# Patient Record
Sex: MALE | Race: WHITE | NOT HISPANIC OR LATINO | URBAN - METROPOLITAN AREA
[De-identification: names, ages, dates, MRNs, and addresses within clinical notes are randomized per-mention and may not be internally consistent; named-entity substitution may affect disease eponyms.]

---

## 2018-07-01 ENCOUNTER — INPATIENT (INPATIENT)
Facility: HOSPITAL | Age: 48
LOS: 2 days | Discharge: HOME | DRG: 872 | End: 2018-07-04
Attending: INTERNAL MEDICINE | Admitting: INTERNAL MEDICINE
Payer: COMMERCIAL

## 2018-07-01 VITALS
OXYGEN SATURATION: 96 % | DIASTOLIC BLOOD PRESSURE: 68 MMHG | SYSTOLIC BLOOD PRESSURE: 124 MMHG | HEART RATE: 118 BPM | RESPIRATION RATE: 20 BRPM | TEMPERATURE: 101 F

## 2018-07-01 DIAGNOSIS — R09.02 HYPOXEMIA: ICD-10-CM

## 2018-07-01 LAB
ALBUMIN SERPL ELPH-MCNC: 4.3 G/DL — SIGNIFICANT CHANGE UP (ref 3.3–5)
ALP SERPL-CCNC: 57 U/L — SIGNIFICANT CHANGE UP (ref 40–120)
ALT FLD-CCNC: 41 U/L — SIGNIFICANT CHANGE UP (ref 10–45)
ANION GAP SERPL CALC-SCNC: 16 MMOL/L — SIGNIFICANT CHANGE UP (ref 5–17)
APPEARANCE UR: CLEAR — SIGNIFICANT CHANGE UP
AST SERPL-CCNC: 19 U/L — SIGNIFICANT CHANGE UP (ref 10–40)
BASE EXCESS BLDV CALC-SCNC: 1.8 MMOL/L — SIGNIFICANT CHANGE UP (ref -2–2)
BASE EXCESS BLDV CALC-SCNC: 4.1 MMOL/L — HIGH (ref -2–2)
BASOPHILS # BLD AUTO: 0 K/UL — SIGNIFICANT CHANGE UP (ref 0–0.2)
BILIRUB SERPL-MCNC: 0.6 MG/DL — SIGNIFICANT CHANGE UP (ref 0.2–1.2)
BILIRUB UR-MCNC: NEGATIVE — SIGNIFICANT CHANGE UP
BUN SERPL-MCNC: 17 MG/DL — SIGNIFICANT CHANGE UP (ref 7–23)
CA-I SERPL-SCNC: 1.12 MMOL/L — SIGNIFICANT CHANGE UP (ref 1.12–1.3)
CA-I SERPL-SCNC: 1.17 MMOL/L — SIGNIFICANT CHANGE UP (ref 1.12–1.3)
CALCIUM SERPL-MCNC: 9.4 MG/DL — SIGNIFICANT CHANGE UP (ref 8.4–10.5)
CHLORIDE BLDV-SCNC: 105 MMOL/L — SIGNIFICANT CHANGE UP (ref 96–108)
CHLORIDE BLDV-SCNC: 106 MMOL/L — SIGNIFICANT CHANGE UP (ref 96–108)
CHLORIDE SERPL-SCNC: 99 MMOL/L — SIGNIFICANT CHANGE UP (ref 96–108)
CO2 BLDV-SCNC: 29 MMOL/L — SIGNIFICANT CHANGE UP (ref 22–30)
CO2 BLDV-SCNC: 30 MMOL/L — SIGNIFICANT CHANGE UP (ref 22–30)
CO2 SERPL-SCNC: 26 MMOL/L — SIGNIFICANT CHANGE UP (ref 22–31)
COLOR SPEC: YELLOW — SIGNIFICANT CHANGE UP
CREAT SERPL-MCNC: 0.95 MG/DL — SIGNIFICANT CHANGE UP (ref 0.5–1.3)
DIFF PNL FLD: NEGATIVE — SIGNIFICANT CHANGE UP
EOSINOPHIL # BLD AUTO: 0.1 K/UL — SIGNIFICANT CHANGE UP (ref 0–0.5)
GAS PNL BLDV: 135 MMOL/L — LOW (ref 136–145)
GAS PNL BLDV: 137 MMOL/L — SIGNIFICANT CHANGE UP (ref 136–145)
GAS PNL BLDV: SIGNIFICANT CHANGE UP
GLUCOSE BLDV-MCNC: 106 MG/DL — HIGH (ref 70–99)
GLUCOSE BLDV-MCNC: 121 MG/DL — HIGH (ref 70–99)
GLUCOSE SERPL-MCNC: 119 MG/DL — HIGH (ref 70–99)
GLUCOSE UR QL: NEGATIVE — SIGNIFICANT CHANGE UP
HCO3 BLDV-SCNC: 28 MMOL/L — SIGNIFICANT CHANGE UP (ref 21–29)
HCO3 BLDV-SCNC: 28 MMOL/L — SIGNIFICANT CHANGE UP (ref 21–29)
HCT VFR BLD CALC: 44.2 % — SIGNIFICANT CHANGE UP (ref 39–50)
HCT VFR BLDA CALC: 43 % — SIGNIFICANT CHANGE UP (ref 39–50)
HCT VFR BLDA CALC: 44 % — SIGNIFICANT CHANGE UP (ref 39–50)
HGB BLD CALC-MCNC: 14.1 G/DL — SIGNIFICANT CHANGE UP (ref 13–17)
HGB BLD CALC-MCNC: 14.3 G/DL — SIGNIFICANT CHANGE UP (ref 13–17)
HGB BLD-MCNC: 14.8 G/DL — SIGNIFICANT CHANGE UP (ref 13–17)
KETONES UR-MCNC: NEGATIVE — SIGNIFICANT CHANGE UP
LACTATE BLDV-MCNC: 2.1 MMOL/L — HIGH (ref 0.7–2)
LACTATE BLDV-MCNC: 2.8 MMOL/L — HIGH (ref 0.7–2)
LEUKOCYTE ESTERASE UR-ACNC: NEGATIVE — SIGNIFICANT CHANGE UP
LYMPHOCYTES # BLD AUTO: 0.7 K/UL — LOW (ref 1–3.3)
LYMPHOCYTES # BLD AUTO: 1 % — LOW (ref 13–44)
MCHC RBC-ENTMCNC: 29.7 PG — SIGNIFICANT CHANGE UP (ref 27–34)
MCHC RBC-ENTMCNC: 33.5 GM/DL — SIGNIFICANT CHANGE UP (ref 32–36)
MCV RBC AUTO: 88.7 FL — SIGNIFICANT CHANGE UP (ref 80–100)
MONOCYTES # BLD AUTO: 0.5 K/UL — SIGNIFICANT CHANGE UP (ref 0–0.9)
MONOCYTES NFR BLD AUTO: 5 % — SIGNIFICANT CHANGE UP (ref 2–14)
NEUTROPHILS # BLD AUTO: 16.8 K/UL — HIGH (ref 1.8–7.4)
NEUTROPHILS NFR BLD AUTO: 91 % — HIGH (ref 43–77)
NEUTS BAND # BLD: 3 % — SIGNIFICANT CHANGE UP (ref 0–8)
NITRITE UR-MCNC: NEGATIVE — SIGNIFICANT CHANGE UP
OTHER CELLS CSF MANUAL: 10 ML/DL — LOW (ref 18–22)
OTHER CELLS CSF MANUAL: 12 ML/DL — LOW (ref 18–22)
PCO2 BLDV: 42 MMHG — SIGNIFICANT CHANGE UP (ref 35–50)
PCO2 BLDV: 51 MMHG — HIGH (ref 35–50)
PH BLDV: 7.35 — SIGNIFICANT CHANGE UP (ref 7.35–7.45)
PH BLDV: 7.44 — SIGNIFICANT CHANGE UP (ref 7.35–7.45)
PH UR: 6 — SIGNIFICANT CHANGE UP (ref 5–8)
PLAT MORPH BLD: NORMAL — SIGNIFICANT CHANGE UP
PLATELET # BLD AUTO: 267 K/UL — SIGNIFICANT CHANGE UP (ref 150–400)
PO2 BLDV: 27 MMHG — SIGNIFICANT CHANGE UP (ref 25–45)
PO2 BLDV: 34 MMHG — SIGNIFICANT CHANGE UP (ref 25–45)
POTASSIUM BLDV-SCNC: 3.5 MMOL/L — SIGNIFICANT CHANGE UP (ref 3.5–5.3)
POTASSIUM BLDV-SCNC: 3.6 MMOL/L — SIGNIFICANT CHANGE UP (ref 3.5–5.3)
POTASSIUM SERPL-MCNC: 3.9 MMOL/L — SIGNIFICANT CHANGE UP (ref 3.5–5.3)
POTASSIUM SERPL-SCNC: 3.9 MMOL/L — SIGNIFICANT CHANGE UP (ref 3.5–5.3)
PROT SERPL-MCNC: 7.8 G/DL — SIGNIFICANT CHANGE UP (ref 6–8.3)
PROT UR-MCNC: NEGATIVE — SIGNIFICANT CHANGE UP
RAPID RVP RESULT: SIGNIFICANT CHANGE UP
RBC # BLD: 4.99 M/UL — SIGNIFICANT CHANGE UP (ref 4.2–5.8)
RBC # FLD: 12.3 % — SIGNIFICANT CHANGE UP (ref 10.3–14.5)
RBC BLD AUTO: NORMAL — SIGNIFICANT CHANGE UP
SAO2 % BLDV: 49 % — LOW (ref 67–88)
SAO2 % BLDV: 60 % — LOW (ref 67–88)
SODIUM SERPL-SCNC: 141 MMOL/L — SIGNIFICANT CHANGE UP (ref 135–145)
SP GR SPEC: 1.01 — SIGNIFICANT CHANGE UP (ref 1.01–1.02)
TROPONIN T, HIGH SENSITIVITY RESULT: <6 NG/L — SIGNIFICANT CHANGE UP (ref 0–51)
UROBILINOGEN FLD QL: NEGATIVE — SIGNIFICANT CHANGE UP
WBC # BLD: 18.2 K/UL — HIGH (ref 3.8–10.5)
WBC # FLD AUTO: 18.2 K/UL — HIGH (ref 3.8–10.5)

## 2018-07-01 PROCEDURE — 99223 1ST HOSP IP/OBS HIGH 75: CPT

## 2018-07-01 PROCEDURE — 74177 CT ABD & PELVIS W/CONTRAST: CPT | Mod: 26

## 2018-07-01 PROCEDURE — 71046 X-RAY EXAM CHEST 2 VIEWS: CPT | Mod: 26

## 2018-07-01 PROCEDURE — 71275 CT ANGIOGRAPHY CHEST: CPT | Mod: 26

## 2018-07-01 PROCEDURE — 99285 EMERGENCY DEPT VISIT HI MDM: CPT

## 2018-07-01 RX ORDER — SODIUM CHLORIDE 9 MG/ML
1000 INJECTION INTRAMUSCULAR; INTRAVENOUS; SUBCUTANEOUS ONCE
Qty: 0 | Refills: 0 | Status: COMPLETED | OUTPATIENT
Start: 2018-07-01 | End: 2018-07-01

## 2018-07-01 RX ORDER — ENOXAPARIN SODIUM 100 MG/ML
180 INJECTION SUBCUTANEOUS ONCE
Qty: 0 | Refills: 0 | Status: COMPLETED | OUTPATIENT
Start: 2018-07-01 | End: 2018-07-02

## 2018-07-01 RX ORDER — ACETAMINOPHEN 500 MG
975 TABLET ORAL ONCE
Qty: 0 | Refills: 0 | Status: COMPLETED | OUTPATIENT
Start: 2018-07-01 | End: 2018-07-01

## 2018-07-01 RX ORDER — KETOROLAC TROMETHAMINE 30 MG/ML
15 SYRINGE (ML) INJECTION ONCE
Qty: 0 | Refills: 0 | Status: DISCONTINUED | OUTPATIENT
Start: 2018-07-01 | End: 2018-07-01

## 2018-07-01 RX ORDER — ONDANSETRON 8 MG/1
4 TABLET, FILM COATED ORAL ONCE
Qty: 0 | Refills: 0 | Status: COMPLETED | OUTPATIENT
Start: 2018-07-01 | End: 2018-07-01

## 2018-07-01 RX ORDER — KETOROLAC TROMETHAMINE 30 MG/ML
30 SYRINGE (ML) INJECTION ONCE
Qty: 0 | Refills: 0 | Status: DISCONTINUED | OUTPATIENT
Start: 2018-07-01 | End: 2018-07-01

## 2018-07-01 RX ADMIN — Medication 15 MILLIGRAM(S): at 22:23

## 2018-07-01 RX ADMIN — SODIUM CHLORIDE 1000 MILLILITER(S): 9 INJECTION INTRAMUSCULAR; INTRAVENOUS; SUBCUTANEOUS at 22:23

## 2018-07-01 RX ADMIN — Medication 30 MILLIGRAM(S): at 17:57

## 2018-07-01 RX ADMIN — Medication 975 MILLIGRAM(S): at 15:19

## 2018-07-01 RX ADMIN — SODIUM CHLORIDE 1000 MILLILITER(S): 9 INJECTION INTRAMUSCULAR; INTRAVENOUS; SUBCUTANEOUS at 15:19

## 2018-07-01 RX ADMIN — Medication 975 MILLIGRAM(S): at 22:23

## 2018-07-01 RX ADMIN — Medication 30 MILLIGRAM(S): at 22:23

## 2018-07-01 RX ADMIN — ONDANSETRON 4 MILLIGRAM(S): 8 TABLET, FILM COATED ORAL at 16:37

## 2018-07-01 NOTE — ED ADULT NURSE REASSESSMENT NOTE - NS ED NURSE REASSESS COMMENT FT1
Pt received sitting in bed diaphoretic. an Pt received sitting in bed diaphoretic, sweat soaking gown. Pt says "I feel like my fever broke." Temp did decrease. Pt was 97% on 3L O2, does not normally use oxygen but he is comfortable using it currently.     Around 2015: pt tachypneic to 30, continues to be on 3L O2, sating well. Denies SOB. Says pain has improved since arrival. Placed on cardiac monitor, tachycardic to 107. MD Prakash and YARED Burrows at bedside. Repeat labs drawn and sent.

## 2018-07-01 NOTE — ED ADULT NURSE NOTE - OBJECTIVE STATEMENT
48 year old male A&OX4 presents with headache, diarrhea, body aches, fevers, chills since this morning. Patient states that on Wednesday he had multiple episodes of diarrhea and finally passed that day. Patient states that this morning he started developing body aches to the lower back, abdomen, lower and upper extremities. Patient also reports headache that started around the same time. Patient was given tramadol and a muscle relaxer by wife and denies relief. Patient's lung sounds are clear and equal bilaterally. Patients abdomen is soft and non tender to palpation. Patient denies nausea, vomiting, chest pain, shortness of breath, palpitations, dizziness, weakness productive cough. Patient denies receiving influenza vaccine this year.

## 2018-07-01 NOTE — ED PROVIDER NOTE - PROGRESS NOTE DETAILS
Eh Gomez MD FACEP patient stable, hypoxic on ra to 92, tachycardic resting, neg PE and ct a/p for abscess/colitis/pe.  Will admit for oxygen and observation for hypoxia/tachycardia/fever

## 2018-07-01 NOTE — ED PROVIDER NOTE - MEDICAL DECISION MAKING DETAILS
ROB Perrin MD: 49 y/o morbidly obese male p/w fever and general malaise x several days, now generalized weakness. S/p multiple episodes of diarrhea earlier in the week. Cough productive with white phlegm. Today Po=828. Today feels LH and dizzy. Some mild abdominal discomfort. Works in sales. DDx: flu, viral syndrome, pna, uti, intraabdominal pathology. Plan: basic labs, bcx, u/a, ucx, cxr, CTAP, pain/fever control, RVP, re-eval

## 2018-07-01 NOTE — ED PROVIDER NOTE - CROS ED ROS STATEMENT
1) No driving for 24 hours and no longer taking narcotics.   2) Return to school / work in 2 weeks.  3) May shower today. No tub bath or standing water for one week.   4) Do not lift / push / pull more then 10 lbs for one week.  5) Report any worsening symptoms.  6) Monitor cath site for signs of bleeding or infection: drainage, swelling, pain, redness, warmth or fever.   7) Report any signs of bleeding.   
all other ROS negative except as per HPI

## 2018-07-01 NOTE — ED PROVIDER NOTE - OBJECTIVE STATEMENT
47 yo morbidly obese male in room 18 accompanied by wife presents to the ER with fever and general malaise. Pt states  "I started non feeling well on Wednesday when I had multiple episodes of diarrhea.  Since then I have weak and tired and this morning I felt feverish and dizzy. I took my temperature and It was 104 so I thought I should come to the ER".  Pt reports mild dull headache and sinus tenderness, which he says is chronic problem and not new.  Productive  cough with white phlegm  reported, denies cp and sob. Reports dizziness since this am. Pt denies nausea and vomiting and diarrhea at this time.  Pt denies any urinary complaints.

## 2018-07-02 DIAGNOSIS — R65.10 SYSTEMIC INFLAMMATORY RESPONSE SYNDROME (SIRS) OF NON-INFECTIOUS ORIGIN WITHOUT ACUTE ORGAN DYSFUNCTION: ICD-10-CM

## 2018-07-02 DIAGNOSIS — E66.9 OBESITY, UNSPECIFIED: ICD-10-CM

## 2018-07-02 DIAGNOSIS — Z29.9 ENCOUNTER FOR PROPHYLACTIC MEASURES, UNSPECIFIED: ICD-10-CM

## 2018-07-02 DIAGNOSIS — R09.02 HYPOXEMIA: ICD-10-CM

## 2018-07-02 DIAGNOSIS — R50.9 FEVER, UNSPECIFIED: ICD-10-CM

## 2018-07-02 DIAGNOSIS — K21.9 GASTRO-ESOPHAGEAL REFLUX DISEASE WITHOUT ESOPHAGITIS: ICD-10-CM

## 2018-07-02 LAB
ANION GAP SERPL CALC-SCNC: 11 MMOL/L — SIGNIFICANT CHANGE UP (ref 5–17)
BASOPHILS # BLD AUTO: 0 K/UL — SIGNIFICANT CHANGE UP (ref 0–0.2)
BASOPHILS NFR BLD AUTO: 0 % — SIGNIFICANT CHANGE UP (ref 0–2)
BUN SERPL-MCNC: 17 MG/DL — SIGNIFICANT CHANGE UP (ref 7–23)
CALCIUM SERPL-MCNC: 8.5 MG/DL — SIGNIFICANT CHANGE UP (ref 8.4–10.5)
CHLORIDE SERPL-SCNC: 100 MMOL/L — SIGNIFICANT CHANGE UP (ref 96–108)
CO2 SERPL-SCNC: 28 MMOL/L — SIGNIFICANT CHANGE UP (ref 22–31)
CREAT SERPL-MCNC: 0.91 MG/DL — SIGNIFICANT CHANGE UP (ref 0.5–1.3)
EOSINOPHIL # BLD AUTO: 0 K/UL — SIGNIFICANT CHANGE UP (ref 0–0.5)
EOSINOPHIL NFR BLD AUTO: 0 % — SIGNIFICANT CHANGE UP (ref 0–6)
GLUCOSE SERPL-MCNC: 96 MG/DL — SIGNIFICANT CHANGE UP (ref 70–99)
HCT VFR BLD CALC: 41.3 % — SIGNIFICANT CHANGE UP (ref 39–50)
HGB BLD-MCNC: 13.3 G/DL — SIGNIFICANT CHANGE UP (ref 13–17)
LYMPHOCYTES # BLD AUTO: 1.27 K/UL — SIGNIFICANT CHANGE UP (ref 1–3.3)
LYMPHOCYTES # BLD AUTO: 5 % — LOW (ref 13–44)
MCHC RBC-ENTMCNC: 28.9 PG — SIGNIFICANT CHANGE UP (ref 27–34)
MCHC RBC-ENTMCNC: 32.2 GM/DL — SIGNIFICANT CHANGE UP (ref 32–36)
MCV RBC AUTO: 89.6 FL — SIGNIFICANT CHANGE UP (ref 80–100)
MONOCYTES # BLD AUTO: 1.02 K/UL — HIGH (ref 0–0.9)
MONOCYTES NFR BLD AUTO: 4 % — SIGNIFICANT CHANGE UP (ref 2–14)
MRSA PCR RESULT.: SIGNIFICANT CHANGE UP
NEUTROPHILS # BLD AUTO: 23.15 K/UL — HIGH (ref 1.8–7.4)
NEUTROPHILS NFR BLD AUTO: 85 % — HIGH (ref 43–77)
PLATELET # BLD AUTO: 263 K/UL — SIGNIFICANT CHANGE UP (ref 150–400)
POTASSIUM SERPL-MCNC: 4.1 MMOL/L — SIGNIFICANT CHANGE UP (ref 3.5–5.3)
POTASSIUM SERPL-SCNC: 4.1 MMOL/L — SIGNIFICANT CHANGE UP (ref 3.5–5.3)
PROCALCITONIN SERPL-MCNC: 4.4 NG/ML — HIGH (ref 0.02–0.1)
RBC # BLD: 4.61 M/UL — SIGNIFICANT CHANGE UP (ref 4.2–5.8)
RBC # FLD: 14.1 % — SIGNIFICANT CHANGE UP (ref 10.3–14.5)
S AUREUS DNA NOSE QL NAA+PROBE: SIGNIFICANT CHANGE UP
SODIUM SERPL-SCNC: 139 MMOL/L — SIGNIFICANT CHANGE UP (ref 135–145)
WBC # BLD: 25.44 K/UL — HIGH (ref 3.8–10.5)
WBC # FLD AUTO: 25.44 K/UL — HIGH (ref 3.8–10.5)

## 2018-07-02 RX ORDER — SODIUM CHLORIDE 9 MG/ML
1000 INJECTION INTRAMUSCULAR; INTRAVENOUS; SUBCUTANEOUS
Qty: 0 | Refills: 0 | Status: DISCONTINUED | OUTPATIENT
Start: 2018-07-02 | End: 2018-07-02

## 2018-07-02 RX ORDER — ACETAMINOPHEN 500 MG
650 TABLET ORAL EVERY 6 HOURS
Qty: 0 | Refills: 0 | Status: DISCONTINUED | OUTPATIENT
Start: 2018-07-02 | End: 2018-07-04

## 2018-07-02 RX ORDER — AMPICILLIN SODIUM AND SULBACTAM SODIUM 250; 125 MG/ML; MG/ML
3 INJECTION, POWDER, FOR SUSPENSION INTRAMUSCULAR; INTRAVENOUS ONCE
Qty: 0 | Refills: 0 | Status: COMPLETED | OUTPATIENT
Start: 2018-07-02 | End: 2018-07-02

## 2018-07-02 RX ORDER — AMPICILLIN SODIUM AND SULBACTAM SODIUM 250; 125 MG/ML; MG/ML
INJECTION, POWDER, FOR SUSPENSION INTRAMUSCULAR; INTRAVENOUS
Qty: 0 | Refills: 0 | Status: DISCONTINUED | OUTPATIENT
Start: 2018-07-02 | End: 2018-07-04

## 2018-07-02 RX ORDER — AMPICILLIN SODIUM AND SULBACTAM SODIUM 250; 125 MG/ML; MG/ML
3 INJECTION, POWDER, FOR SUSPENSION INTRAMUSCULAR; INTRAVENOUS EVERY 6 HOURS
Qty: 0 | Refills: 0 | Status: DISCONTINUED | OUTPATIENT
Start: 2018-07-02 | End: 2018-07-04

## 2018-07-02 RX ORDER — PANTOPRAZOLE SODIUM 20 MG/1
40 TABLET, DELAYED RELEASE ORAL
Qty: 0 | Refills: 0 | Status: DISCONTINUED | OUTPATIENT
Start: 2018-07-02 | End: 2018-07-04

## 2018-07-02 RX ADMIN — PANTOPRAZOLE SODIUM 40 MILLIGRAM(S): 20 TABLET, DELAYED RELEASE ORAL at 05:34

## 2018-07-02 RX ADMIN — AMPICILLIN SODIUM AND SULBACTAM SODIUM 200 GRAM(S): 250; 125 INJECTION, POWDER, FOR SUSPENSION INTRAMUSCULAR; INTRAVENOUS at 11:21

## 2018-07-02 RX ADMIN — AMPICILLIN SODIUM AND SULBACTAM SODIUM 200 GRAM(S): 250; 125 INJECTION, POWDER, FOR SUSPENSION INTRAMUSCULAR; INTRAVENOUS at 18:09

## 2018-07-02 RX ADMIN — ENOXAPARIN SODIUM 180 MILLIGRAM(S): 100 INJECTION SUBCUTANEOUS at 00:03

## 2018-07-02 RX ADMIN — Medication 650 MILLIGRAM(S): at 13:51

## 2018-07-02 RX ADMIN — AMPICILLIN SODIUM AND SULBACTAM SODIUM 200 GRAM(S): 250; 125 INJECTION, POWDER, FOR SUSPENSION INTRAMUSCULAR; INTRAVENOUS at 23:23

## 2018-07-02 NOTE — CONSULT NOTE ADULT - SUBJECTIVE AND OBJECTIVE BOX
HPI:  48M with PMH of GERD p/w fevers. Pt states he all of a sudden started feeling unwell, lightheaded, fatigues and lethargic and hot; his wife took his temperature and reported 104 degree temp and decided to come to ED. In ED, pt endorses headache and non productive cough which has since resolved and endorses shaking chills with fevers. Pt states he had self limited diarrhea 4 days ago for which he had to leave work early; but was feeling his usual self the days after until day of presentation. Also states he has been having muscle tightness in his ankles and thighs intermittently. Denies any visual changes, confusion, neck stiffness, CP, SOB, wheezing, abdominal pain, nausea/vomiting, diarrhea, rash, dysuria. 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       In ED, pt noted to be hypoxic to 93%RA and tachycardic and febrile - pt placed on 3LNC and had CT C/A/P performed. Lovenox 180mg given. Lovenox, fluids and toradol given.   ED Vitals: Tm 100.8  HR   114/66  20  97% 3LNC (2018 00:29)      PAST MEDICAL & SURGICAL HISTORY:  GERD (gastroesophageal reflux disease)  No significant past surgical history      Antimicrobials      Immunological      Other  acetaminophen   Tablet 650 milliGRAM(s) Oral every 6 hours PRN  pantoprazole    Tablet 40 milliGRAM(s) Oral before breakfast      Allergies    No Known Allergies    Intolerances    SOCIAL HISTORY: no tobacco use    FAMILY HISTORY:  No pertinent family history in first degree relatives      ROS:    EYES:  Negative  blurry vision or double vision  GASTROINTESTINAL:  Negative for nausea, vomiting  -otherwise negative except for subjective    Vital Signs Last 24 Hrs  T(C): 37.5 (2018 09:38), Max: 38.2 (2018 14:24)  T(F): 99.5 (2018 09:38), Max: 100.8 (2018 14:24)  HR: 92 (2018 09:38) (85 - 118)  BP: 126/73 (2018 09:38) (101/71 - 126/73)  BP(mean): --  RR: 18 (2018 09:38) (18 - 26)  SpO2: 96% (2018 09:38) (92% - 98%)    PE:  Morbidly obese male in NAD  HEENT:  NC, PERRL, sclerae anicteric, conjunctivae clear, EOMI.  Sinuses nontender, no nasal exudate.  No buccal or pharyngeal lesions, erythema or exudate  Neck:  Supple, no adenopathy  Lungs:  No accessory muscle use, bilaterally clear to auscultation  Cor:  RRR, S1, S2, no murmur appreciated  Abd:  Symmetric, normoactive BS.  Soft, nontender, no masses, guarding or rebound.  Liver and spleen not enlarged  Extrem/Skin: right LE from below the knee warm, erythematous, pitting edema, no sig tenderness  Neuro: grossly intact  Musc: moving all limbs freely, no focal deficits    LABS:                        14.8   18.2  )-----------( 267      ( 2018 15:18 )             44.2       WBC Count: 18.2 K/uL (18 @ 15:18)          139  |  100  |  17  ----------------------------<  96  4.1   |  28  |  0.91    Ca    8.5      2018 07:31    TPro  7.8  /  Alb  4.3  /  TBili  0.6  /  DBili  x   /  AST  19  /  ALT  41  /  AlkPhos  57        Creatinine, Serum: 0.91 mg/dL (18 @ 07:31)  Creatinine, Serum: 0.95 mg/dL (18 @ 15:18)      Urinalysis Basic - ( 2018 15:18 )    Color: Yellow / Appearance: Clear / S.015 / pH: x  Gluc: x / Ketone: Negative  / Bili: Negative / Urobili: Negative   Blood: x / Protein: Negative / Nitrite: Negative   Leuk Esterase: Negative / RBC: x / WBC x   Sq Epi: x / Non Sq Epi: x / Bacteria: x    MICROBIOLOGY:      RADIOLOGY & ADDITIONAL STUDIES:    --< from: CT Angio Chest w/ IV Cont (18 @ 21:00) >    EXAM:  CT ANGIO CHEST (W)AW IC                            PROCEDURE DATE:  2018            INTERPRETATION:  CLINICAL INFORMATION: Fever. Body aches. Shortness of   breath and coughing    COMPARISON: CT abdomen and pelvis 2018    PROCEDURE:   CT Angiography of the Chest.  90 ml of Omnipaque 350 was injected intravenously. 10 ml were discarded.  Sagittal and coronal reformats were performed as well as 3D (MIP)   reconstructions.      FINDINGS:    CHEST:     LUNGS AND LARGE AIRWAYS: Patent central airways.  Bibasilar subsegmental   atelectasis.  PLEURA: No pleural effusion. No pneumothorax.  VESSELS: Very limited visualization of the pulmonary arterial branches   secondary to respiratory motion artifact and poor opacification of the   main pulmonary artery.  HEART: Heart size is normal. No pericardial effusion.  MEDIASTINUM AND CHRISTIANE: No lymphadenopathy.  CHEST WALL AND LOWER NECK: Within normal limits.  VISUALIZED UPPER ABDOMEN: Within normal limits.  BONES: Mild degenerative changes of the spine.    IMPRESSION:     Limited visualization of the pulmonary arterial branches secondary to   respiratory motion artifact and poor opacification of the main pulmonary   artery. Nondiagnostic study.    If there is further clinical concern for pulmonary embolism, a   ventilation/perfusion scan can be obtained.    < from: CT Abdomen and Pelvis w/ Oral Cont and w/ IV Cont (18 @ 18:51) >    EXAM:  CT ABDOMEN AND PELVIS OC IC                            PROCEDURE DATE:  2018            INTERPRETATION:  CLINICAL INFORMATION: Right lower quadrant abdominal   pain.    COMPARISON: None.    PROCEDURE:   CT of the Abdomen and Pelvis wasperformed with intravenous contrast.   Intravenous contrast: 90 ml Omnipaque 350. 10 ml discarded.  Oral contrast: positive contrast was administered.  Sagittal and coronal reformats were performed.    FINDINGS:    LOWER CHEST: Within normal limits.    LIVER: Within normal limits.  BILE DUCTS: Normal caliber.  GALLBLADDER: Within normal limits.  SPLEEN: Incidental splenules are noted.  PANCREAS: Within normal limits.  ADRENALS: Within normal limits.  KIDNEYS/URETERS: Within normal limits.    BLADDER: Within normal limits.  REPRODUCTIVE ORGANS: Prostate is within normal limits.    BOWEL: No bowel obstruction. Appendix is within normal limits.   Diverticulosis without evidence of diverticulitis.  PERITONEUM: No ascites.  VESSELS:  Within normallimits.  RETROPERITONEUM: No lymphadenopathy.    ABDOMINAL WALL: Within normal limits.  BONES: Within normal limits.    IMPRESSION: No appendicitis. Diverticulosis without evidence of   diverticulitis. HPI:  48M with PMH of GERD p/w fevers. Pt states he all of a sudden started feeling unwell, lightheaded, fatigues and lethargic and hot; his wife took his temperature and reported 104 degree temp and decided to come to ED. In ED, pt endorses headache and non productive cough which has since resolved and endorses shaking chills with fevers. Pt states he had self limited diarrhea 4 days ago for which he had to leave work early; but was feeling his usual self the days after until day of presentation. Also states he has been having muscle tightness in his ankles and thighs intermittently. Denies any visual changes, confusion, neck stiffness, CP, SOB, wheezing, abdominal pain, nausea/vomiting, diarrhea, rash, dysuria. 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       In ED, pt noted to be hypoxic to 93%RA and tachycardic and febrile - pt placed on 3LNC and had CT C/A/P performed. Lovenox 180mg given. Lovenox, fluids and toradol given.   ED Vitals: Tm 100.8  HR   114/66  20  97% 3LNC (2018 00:29)      PAST MEDICAL & SURGICAL HISTORY:  GERD (gastroesophageal reflux disease)  No significant past surgical history      Antimicrobials      Immunological      Other  acetaminophen   Tablet 650 milliGRAM(s) Oral every 6 hours PRN  pantoprazole    Tablet 40 milliGRAM(s) Oral before breakfast      Allergies    No Known Allergies    Intolerances    SOCIAL HISTORY: no tobacco use    FAMILY HISTORY:  No pertinent family history in first degree relatives      ROS:    EYES:  Negative  blurry vision or double vision  GASTROINTESTINAL:  Negative for nausea, vomiting  -otherwise negative except for subjective    Vital Signs Last 24 Hrs  T(C): 37.5 (2018 09:38), Max: 38.2 (2018 14:24)  T(F): 99.5 (2018 09:38), Max: 100.8 (2018 14:24)  HR: 92 (2018 09:38) (85 - 118)  BP: 126/73 (2018 09:38) (101/71 - 126/73)  BP(mean): --  RR: 18 (2018 09:38) (18 - 26)  SpO2: 96% (2018 09:38) (92% - 98%)    PE:  Morbidly obese male in NAD  HEENT:  NC, PERRL, sclerae anicteric, conjunctivae clear, EOMI.  Sinuses nontender, no nasal exudate.  No buccal or pharyngeal lesions, erythema or exudate  Neck:  Supple, no adenopathy  Lungs:  No accessory muscle use, bilaterally clear to auscultation  Cor:  RRR, S1, S2, no murmur appreciated  Abd:  Symmetric, normoactive BS.  Soft, nontender, no masses, guarding or rebound.  Liver and spleen not enlarged  Extrem/Skin: right LE from below the knee warm, erythematous, pitting edema, no sig tenderness, asymmetry in girth  Neuro: grossly intact  Musc: moving all limbs freely, no focal deficits    LABS:                        14.8   18.2  )-----------( 267      ( 2018 15:18 )             44.2       WBC Count: 18.2 K/uL (18 @ 15:18)          139  |  100  |  17  ----------------------------<  96  4.1   |  28  |  0.91    Ca    8.5      2018 07:31    TPro  7.8  /  Alb  4.3  /  TBili  0.6  /  DBili  x   /  AST  19  /  ALT  41  /  AlkPhos  57        Creatinine, Serum: 0.91 mg/dL (18 @ 07:31)  Creatinine, Serum: 0.95 mg/dL (18 @ 15:18)      Urinalysis Basic - ( 2018 15:18 )    Color: Yellow / Appearance: Clear / S.015 / pH: x  Gluc: x / Ketone: Negative  / Bili: Negative / Urobili: Negative   Blood: x / Protein: Negative / Nitrite: Negative   Leuk Esterase: Negative / RBC: x / WBC x   Sq Epi: x / Non Sq Epi: x / Bacteria: x    MICROBIOLOGY:      RADIOLOGY & ADDITIONAL STUDIES:    --< from: CT Angio Chest w/ IV Cont (18 @ 21:00) >    EXAM:  CT ANGIO CHEST (W)AW IC                            PROCEDURE DATE:  2018            INTERPRETATION:  CLINICAL INFORMATION: Fever. Body aches. Shortness of   breath and coughing    COMPARISON: CT abdomen and pelvis 2018    PROCEDURE:   CT Angiography of the Chest.  90 ml of Omnipaque 350 was injected intravenously. 10 ml were discarded.  Sagittal and coronal reformats were performed as well as 3D (MIP)   reconstructions.      FINDINGS:    CHEST:     LUNGS AND LARGE AIRWAYS: Patent central airways.  Bibasilar subsegmental   atelectasis.  PLEURA: No pleural effusion. No pneumothorax.  VESSELS: Very limited visualization of the pulmonary arterial branches   secondary to respiratory motion artifact and poor opacification of the   main pulmonary artery.  HEART: Heart size is normal. No pericardial effusion.  MEDIASTINUM AND CHRISTIANE: No lymphadenopathy.  CHEST WALL AND LOWER NECK: Within normal limits.  VISUALIZED UPPER ABDOMEN: Within normal limits.  BONES: Mild degenerative changes of the spine.    IMPRESSION:     Limited visualization of the pulmonary arterial branches secondary to   respiratory motion artifact and poor opacification of the main pulmonary   artery. Nondiagnostic study.    If there is further clinical concern for pulmonary embolism, a   ventilation/perfusion scan can be obtained.    < from: CT Abdomen and Pelvis w/ Oral Cont and w/ IV Cont (18 @ 18:51) >    EXAM:  CT ABDOMEN AND PELVIS OC IC                            PROCEDURE DATE:  2018            INTERPRETATION:  CLINICAL INFORMATION: Right lower quadrant abdominal   pain.    COMPARISON: None.    PROCEDURE:   CT of the Abdomen and Pelvis wasperformed with intravenous contrast.   Intravenous contrast: 90 ml Omnipaque 350. 10 ml discarded.  Oral contrast: positive contrast was administered.  Sagittal and coronal reformats were performed.    FINDINGS:    LOWER CHEST: Within normal limits.    LIVER: Within normal limits.  BILE DUCTS: Normal caliber.  GALLBLADDER: Within normal limits.  SPLEEN: Incidental splenules are noted.  PANCREAS: Within normal limits.  ADRENALS: Within normal limits.  KIDNEYS/URETERS: Within normal limits.    BLADDER: Within normal limits.  REPRODUCTIVE ORGANS: Prostate is within normal limits.    BOWEL: No bowel obstruction. Appendix is within normal limits.   Diverticulosis without evidence of diverticulitis.  PERITONEUM: No ascites.  VESSELS:  Within normallimits.  RETROPERITONEUM: No lymphadenopathy.    ABDOMINAL WALL: Within normal limits.  BONES: Within normal limits.    IMPRESSION: No appendicitis. Diverticulosis without evidence of   diverticulitis.

## 2018-07-02 NOTE — PATIENT PROFILE ADULT. - TEACHING/LEARNING LEARNING PREFERENCES
individual instruction/verbal instruction/computer/internet/group instruction/video/written material/audio/pictorial/skill demonstration

## 2018-07-02 NOTE — CONSULT NOTE ADULT - PROBLEM SELECTOR RECOMMENDATION 9
The only suggested localization in right LE with erythema, increased warmth and a suggestion of cellulitis. The report of rigors is concerning for transient bacteremia and the level of leukocytosis with left shift is more consistent with a bacterial rather than a viral process.  -will order some additional diagnostic tests and will start patient on IV Unasyn  -further recs to follow based on clinical course The only suggested localization in right LE with erythema, increased warmth and a suggestion of cellulitis. The report of rigors is concerning for transient bacteremia and the level of leukocytosis with left shift is more consistent with a bacterial rather than a viral process.  -consider bilateral LE Ultrasound (noted asymmetry)  -will order some additional diagnostic tests and will start patient on IV Unasyn  -further recs to follow based on clinical course

## 2018-07-02 NOTE — CONSULT NOTE ADULT - ASSESSMENT
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

## 2018-07-02 NOTE — H&P ADULT - NSHPPHYSICALEXAM_GEN_ALL_CORE
PHYSICAL EXAM:  GENERAL: NAD, well-developed  HEAD:  Atraumatic, normocephalic  EYES: EOMI, conjunctiva and sclera clear  NECK: Supple, no JVD  CHEST/LUNG: Clear to auscultation bilaterally; no wheezing or rales  HEART: Regular rate and rhythm; no murmurs  ABDOMEN: Soft, nontender, nondistended; bowel sounds present  EXTREMITIES: no clubbing, cyanosis, or edema  PSYCH: calm affect, not anxious  NEUROLOGY: non-focal, AAOx3  SKIN: No rashes or lesions  MUSCULOSKELETAL: no back pain, moving all extremities

## 2018-07-02 NOTE — H&P ADULT - ASSESSMENT
48M w PMH GERD p/w fevers x 1 day, hypoxic, febrile and tachycardic in ED, with imaging showing no PE or infectious etiology - admitted for further work-up.

## 2018-07-02 NOTE — CONSULT NOTE ADULT - PROBLEM SELECTOR RECOMMENDATION 4
if this is cellulitis anticipate delayed improvement.    Thank you for consulting us and involving us in the management of this most interesting and challenging case.     We will follow along in the care of this patient.

## 2018-07-02 NOTE — H&P ADULT - NSHPREVIEWOFSYSTEMS_GEN_ALL_CORE
CONSTITUTIONAL: +fevers, +chills, +generalized weakness   EYES/ENT: No visual changes;  no vertigo or throat pain   NECK: No pain or stiffness  RESPIRATORY: No cough, wheezing, hemoptysis; no shortness of breath  CARDIOVASCULAR: No chest pain or palpitations  GASTROINTESTINAL: no nausea, vomiting, no abdominal pain, no BRBPR  GENITOURINARY: no polyuria, no dysuria  NEUROLOGICAL: no numbness, no headaches, no confusion   MUSCULOSKELETAL: no back pain, no weakness   SKIN: No itching, burning, rashes, or lesions   PSYCH: no anxiety, depression  HEME: no gum bleeding, no bruising

## 2018-07-02 NOTE — H&P ADULT - PROBLEM SELECTOR PLAN 3
fever/hypoxia and tachycardia suspicious for PE, with CTA non diagnostic, s/p full dose Lovenox  will check b/l LE dopplers given LE tightness   if dopplers negative and suspicion remains high for PE, may need to check repeat CTA  Lovenox dose will remains effective for 24hrs, will hold off on ordering standing AC until work-up completed

## 2018-07-02 NOTE — H&P ADULT - PROBLEM SELECTOR PLAN 2
pt with fever, tachycardia and leukocytosis, and elevated lactate but unclear etiology at this time - r/o PE vs infectious vs less likely rheum/drug induced   all imaging and w/u thus far negative or non diagnostic  c/w hydration via IVF   f/u culture data   repeat labs in AM  vitals q4h  r/o PE as detailed below

## 2018-07-02 NOTE — H&P ADULT - PROBLEM SELECTOR PLAN 4
pt initially hypoxic to 93%RA when arriving to ED; when examined by me, pt not in distress and saturating 97% on RA; ddx includes possible PE as noted above, vs EDVIN/OHS given pts body habitus   currently saturating well on RA  supplemental O2 as needed   pulmonary eval if continues without source

## 2018-07-02 NOTE — CHART NOTE - NSCHARTNOTEFT_GEN_A_CORE
Pt seen and examined at bedside. Please refer to the H&P done today for details.  No overnight events.   Started on abx for SIRS with right LE cellulitis.  Pt feels well. sitting up. comfortable. Breathing on room air. spO2 92-97%  Denied cp, sob, n/v/d.  no abdominal pain. No HA/dizziness.   pending LE US to r/o DVT.  CT chest reviewed. limited study. Less likely PE.   His overall clinical presentation more consistent with SIRS with leukocytosis and tachycardia with right LE cellulitis.  f/u cultures.   D/w pt and his parents at bedside.   D/w ID Dr. Drew.     - Dr. OWENS et ([facility])  - [phone]

## 2018-07-02 NOTE — H&P ADULT - PROBLEM SELECTOR PLAN 1
pt with fevers x 1 day, Tm 100.8 in ED with tachycardia and hypoxia; ddx includes PE vs infectious etiology vs rheumatologic process.   - CT A non diagnostic of PE, s/p full dose Lovenox and now with improved HD  - given c/o of tightness in LE, will check b/l LE dopplers   - if non diagnostic, pt may require repeat CTA  - ddx includes infectious etiology, but no source elucidated; given improvement in HDs without any abx and no clear source on exam/ROS/imaging, will hold off on abx for now. Will monitor HD status closely and if worsening hemodynamics/fevers, will start abx at that time  - f/u culture data  - ID c/s in AM to eval for need for abx  - monitor fever curves, tylenol prn

## 2018-07-02 NOTE — H&P ADULT - NSHPSOCIALHISTORY_GEN_ALL_CORE
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

## 2018-07-02 NOTE — H&P ADULT - NSHPLABSRESULTS_GEN_ALL_CORE
Labs personally reviewed and interpreted by me - CBC with leukocytosis, UA/CMP/RVP neg  Imaging personally reviewed and interpreted by me - CXR with no focal consolidation  EKG personally reviewed and interpreted by me -                           14.8   18.2  )-----------( 267      ( 2018 15:18 )             44.2         141  |  99  |  17  ----------------------------<  119<H>  3.9   |  26  |  0.95    Ca    9.4      2018 15:18    TPro  7.8  /  Alb  4.3  /  TBili  0.6  /  DBili  x   /  AST  19  /  ALT  41  /  AlkPhos  57        Urinalysis Basic - ( 2018 15:18 )    Color: Yellow / Appearance: Clear / S.015 / pH: x  Gluc: x / Ketone: Negative  / Bili: Negative / Urobili: Negative   Blood: x / Protein: Negative / Nitrite: Negative   Leuk Esterase: Negative / RBC: x / WBC x   Sq Epi: x / Non Sq Epi: x / Bacteria: x      < from: Xray Chest 2 Views PA/Lat (18 @ 17:20) >    ******PRELIMINARY REPORT******        INTERPRETATION:  clear lungs    from: CT Angio Chest w/ IV Cont (18 @ 21:00) >  IMPRESSION:   Limited visualization of the pulmonary arterial branches secondary to   respiratory motion artifact and poor opacification of the main pulmonary   artery. Nondiagnostic study.    If there is further clinical concern for pulmonary embolism, a   ventilation/perfusion scan can be obtained.    from: CT Abdomen and Pelvis w/ Oral Cont and w/ IV Cont (18 @ 18:51) >  IMPRESSION: No appendicitis. Diverticulosis without evidence of   diverticulitis.

## 2018-07-02 NOTE — H&P ADULT - ATTENDING COMMENTS
Patient assigned to me by night hospitalist in charge for management and care for patient for this evening only. Care to be resumed by day hospitalist (Dr Landaverde) in the morning and thereafter.

## 2018-07-02 NOTE — H&P ADULT - HISTORY OF PRESENT ILLNESS
48M with PMH of GERD p/w fevers. Pt states he all of a sudden started feeling unwell, lightheaded, fatigues and lethargic and hot; his wife took his temperature and reported 104 degree temp and decided to come to ED. In ED, pt endorses headache and non productive cough which has since resolved and endorses shaking chills with fevers. Pt states he had self limited diarrhea 4 days ago for which he had to leave work early; but was feeling his usual self the days after until day of presentation. Also states he has been having muscle tightness in his ankles and thighs intermittently. Denies any visual changes, confusion, neck stiffness, CP, SOB, wheezing, abdominal pain, nausea/vomiting, diarrhea, rash, dysuria. 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       In ED, pt noted to be hypoxic to 93%RA and tachycardic and febrile - pt placed on 3LNC and had CT C/A/P performed. Lovenox 180mg given. Lovenox, fluids and toradol given.   ED Vitals: Tm 100.8  HR   114/66  20  97% 3LNC

## 2018-07-03 LAB
ANION GAP SERPL CALC-SCNC: 12 MMOL/L — SIGNIFICANT CHANGE UP (ref 5–17)
BUN SERPL-MCNC: 13 MG/DL — SIGNIFICANT CHANGE UP (ref 7–23)
CALCIUM SERPL-MCNC: 8.7 MG/DL — SIGNIFICANT CHANGE UP (ref 8.4–10.5)
CHLORIDE SERPL-SCNC: 102 MMOL/L — SIGNIFICANT CHANGE UP (ref 96–108)
CO2 SERPL-SCNC: 26 MMOL/L — SIGNIFICANT CHANGE UP (ref 22–31)
CREAT SERPL-MCNC: 0.85 MG/DL — SIGNIFICANT CHANGE UP (ref 0.5–1.3)
GLUCOSE SERPL-MCNC: 105 MG/DL — HIGH (ref 70–99)
HCT VFR BLD CALC: 42.6 % — SIGNIFICANT CHANGE UP (ref 39–50)
HGB BLD-MCNC: 13.3 G/DL — SIGNIFICANT CHANGE UP (ref 13–17)
MCHC RBC-ENTMCNC: 28 PG — SIGNIFICANT CHANGE UP (ref 27–34)
MCHC RBC-ENTMCNC: 31.2 GM/DL — LOW (ref 32–36)
MCV RBC AUTO: 89.6 FL — SIGNIFICANT CHANGE UP (ref 80–100)
PLATELET # BLD AUTO: 249 K/UL — SIGNIFICANT CHANGE UP (ref 150–400)
POTASSIUM SERPL-MCNC: 3.8 MMOL/L — SIGNIFICANT CHANGE UP (ref 3.5–5.3)
POTASSIUM SERPL-SCNC: 3.8 MMOL/L — SIGNIFICANT CHANGE UP (ref 3.5–5.3)
RBC # BLD: 4.75 M/UL — SIGNIFICANT CHANGE UP (ref 4.2–5.8)
RBC # FLD: 12.3 % — SIGNIFICANT CHANGE UP (ref 10.3–14.5)
SODIUM SERPL-SCNC: 140 MMOL/L — SIGNIFICANT CHANGE UP (ref 135–145)
WBC # BLD: 10.6 K/UL — HIGH (ref 3.8–10.5)
WBC # FLD AUTO: 10.6 K/UL — HIGH (ref 3.8–10.5)

## 2018-07-03 PROCEDURE — 93970 EXTREMITY STUDY: CPT | Mod: 26

## 2018-07-03 RX ADMIN — AMPICILLIN SODIUM AND SULBACTAM SODIUM 200 GRAM(S): 250; 125 INJECTION, POWDER, FOR SUSPENSION INTRAMUSCULAR; INTRAVENOUS at 12:06

## 2018-07-03 RX ADMIN — Medication 650 MILLIGRAM(S): at 08:29

## 2018-07-03 RX ADMIN — AMPICILLIN SODIUM AND SULBACTAM SODIUM 200 GRAM(S): 250; 125 INJECTION, POWDER, FOR SUSPENSION INTRAMUSCULAR; INTRAVENOUS at 18:15

## 2018-07-03 RX ADMIN — AMPICILLIN SODIUM AND SULBACTAM SODIUM 200 GRAM(S): 250; 125 INJECTION, POWDER, FOR SUSPENSION INTRAMUSCULAR; INTRAVENOUS at 05:10

## 2018-07-03 RX ADMIN — AMPICILLIN SODIUM AND SULBACTAM SODIUM 200 GRAM(S): 250; 125 INJECTION, POWDER, FOR SUSPENSION INTRAMUSCULAR; INTRAVENOUS at 23:16

## 2018-07-03 RX ADMIN — PANTOPRAZOLE SODIUM 40 MILLIGRAM(S): 20 TABLET, DELAYED RELEASE ORAL at 05:10

## 2018-07-03 NOTE — PROGRESS NOTE ADULT - ASSESSMENT
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

## 2018-07-03 NOTE — PROGRESS NOTE ADULT - SUBJECTIVE AND OBJECTIVE BOX
Patient is a 48y old  Male who presents with a chief complaint of fever (02 Jul 2018 00:29)      SUBJECTIVE / OVERNIGHT EVENTS:  Pt seen and examined at bedside.   No overnight event.  Feeling better. "have more energy"  no cp, no sob, no n/v/d.       Vital Signs Last 24 Hrs  T(C): 36.9 (03 Jul 2018 09:12), Max: 37.1 (02 Jul 2018 22:17)  T(F): 98.4 (03 Jul 2018 09:12), Max: 98.8 (02 Jul 2018 22:17)  HR: 80 (03 Jul 2018 09:12) (70 - 89)  BP: 116/76 (03 Jul 2018 09:12) (107/71 - 121/78)  BP(mean): --  RR: 18 (03 Jul 2018 09:12) (18 - 20)  SpO2: 92% (03 Jul 2018 09:12) (92% - 96%)  I&O's Summary    02 Jul 2018 07:01  -  03 Jul 2018 07:00  --------------------------------------------------------  IN: 1120 mL / OUT: 1350 mL / NET: -230 mL    03 Jul 2018 07:01  -  03 Jul 2018 17:37  --------------------------------------------------------  IN: 460 mL / OUT: 900 mL / NET: -440 mL        PHYSICAL EXAM:  GENERAL: NAD, Comfortable, morbidly obese  HEAD:  Atraumatic, Normocephalic  EYES: EOMI, PERRLA, conjunctiva and sclera clear  NECK: Supple, No JVD  CHEST/LUNG: Clear to auscultation bilaterally; No wheeze  HEART: Regular rate and rhythm; No murmurs, rubs, or gallops  ABDOMEN: Soft, Nontender, Nondistended; Bowel sounds present  Neuro: AAO x 3, no focal deficit, 5/5 b/l extremities  EXTREMITIES:  2+ Peripheral Pulses, No clubbing, cyanosis, no edema, right leg warmth, erythema around slightly improved compare to yesterday.   SKIN: No rashes or lesions    LABS:                        13.3   10.6  )-----------( 249      ( 03 Jul 2018 08:38 )             42.6     07-03    140  |  102  |  13  ----------------------------<  105<H>  3.8   |  26  |  0.85    Ca    8.7      03 Jul 2018 08:44        CAPILLARY BLOOD GLUCOSE                RADIOLOGY & ADDITIONAL TESTS:    Imaging Personally Reviewed:  [x] YES  [ ] NO    Consultant(s) Notes Reviewed:  [x] YES  [ ] NO      MEDICATIONS  (STANDING):  ampicillin/sulbactam  IVPB      ampicillin/sulbactam  IVPB 3 Gram(s) IV Intermittent every 6 hours  pantoprazole    Tablet 40 milliGRAM(s) Oral before breakfast    MEDICATIONS  (PRN):  acetaminophen   Tablet 650 milliGRAM(s) Oral every 6 hours PRN For Temp greater than 38 C (100.4 F)      Care Discussed with Consultants/Other Providers [x] YES  [ ] NO    HEALTH ISSUES - PROBLEM Dx:  Obesity: Obesity  Need for prophylactic measure: Need for prophylactic measure  GERD (gastroesophageal reflux disease): GERD (gastroesophageal reflux disease)  Hypoxia: Hypoxia  SIRS (systemic inflammatory response syndrome): SIRS (systemic inflammatory response syndrome)  Fever: Fever

## 2018-07-03 NOTE — PROGRESS NOTE ADULT - SUBJECTIVE AND OBJECTIVE BOX
[facility], Division of Infectious Diseases  ANTONY Vargas A. Lee  [phone]  Name: BARAK TURNER  Age: 48y  Gender: Male  MRN: [id number]    Interval History--  Notes reviewed  pt feels lle is much better.   no further fevers    Past Medical History--  GERD (gastroesophageal reflux disease)  No significant past surgical history      For details regarding the patient's social history, family history, and other miscellaneous elements, please refer the initial infectious diseases consultation and/or the admitting history and physical examination for this admission.    Allergies    No Known Allergies    Intolerances        Medications--  Antibiotics:  ampicillin/sulbactam  IVPB      ampicillin/sulbactam  IVPB 3 Gram(s) IV Intermittent every 6 hours    Immunologic:    Other:  acetaminophen   Tablet PRN  pantoprazole    Tablet      Review of Systems--  A 10-point review of systems was obtained.     Pertinent positives and negatives--  Constitutional: No fevers. No Chills. No Rigors.   Cardiovascular: No chest pain. No palpitations.  Respiratory: No shortness of breath. No cough.  Gastrointestinal: No nausea or vomiting. No diarrhea or constipation.   Psychiatric:no anxiety    Review of systems otherwise negative except as previously noted.    Physical Examination--  Vital Signs: T(F): 98.4 (07-03-18 @ 09:12), Max: 98.8 (07-02-18 @ 22:17)  HR: 80 (07-03-18 @ 09:12)  BP: 116/76 (07-03-18 @ 09:12)  RR: 18 (07-03-18 @ 09:12)  SpO2: 92% (07-03-18 @ 09:12)  Wt(kg): --  General: Nontoxic-appearing Male in no acute distress.  HEENT: AT/NC. . Anicteric. Conjunctiva pink and moist. Oropharynx clear. Dentition fair.  Neck: Not rigid. No sense of mass.  Nodes: None palpable.  Lungs: Clear bilaterally without rales, wheezing or rhonchi  Heart: Regular rate and rhythm. No Murmur. No rub. No gallop. No palpable thrill.  Abdomen: Bowel sounds present and normoactive. Soft. Nondistended.   Extremities: No cyanosis or clubbing. LLE edema. mild erythema, not warm, nontender  Skin: Warm. Dry. Good turgor. No rash. No vasculitic stigmata.  Psychiatric: Appropriate affect and mood for situation.         Laboratory Studies--  CBC                        13.3   10.6  )-----------( 249      ( 03 Jul 2018 08:38 )             42.6       Chemistries  07-03    140  |  102  |  13  ----------------------------<  105<H>  3.8   |  26  |  0.85    Ca    8.7      03 Jul 2018 08:44    TPro  7.8  /  Alb  4.3  /  TBili  0.6  /  DBili  x   /  AST  19  /  ALT  41  /  AlkPhos  57  07-01      Culture Data    Culture - Blood (collected 01 Jul 2018 16:50)  Source: .Blood Blood-Peripheral  Preliminary Report (02 Jul 2018 17:02):    No growth to date.    Culture - Blood (collected 01 Jul 2018 16:50)  Source: .Blood Blood-Peripheral  Preliminary Report (02 Jul 2018 17:02):    No growth to date.      < from: CT Angio Chest w/ IV Cont (07.01.18 @ 21:00) >    EXAM:  CT ANGIO CHEST (W)AW IC                            PROCEDURE DATE:  07/01/2018            INTERPRETATION:  CLINICAL INFORMATION: Fever. Body aches. Shortness of   breath and coughing    COMPARISON: CT abdomen and pelvis 7/1/2018    PROCEDURE:   CT Angiography of the Chest.  90 ml of Omnipaque 350 was injected intravenously. 10 ml were discarded.  Sagittal and coronal reformats were performed as well as 3D (MIP)   reconstructions.      FINDINGS:    CHEST:     LUNGS AND LARGE AIRWAYS: Patent central airways.  Bibasilar subsegmental   atelectasis.  PLEURA: No pleural effusion. No pneumothorax.  VESSELS: Very limited visualization of the pulmonary arterial branches   secondary to respiratory motion artifact and poor opacification of the   main pulmonary artery.  HEART: Heart size is normal. No pericardial effusion.  MEDIASTINUM AND CHRISTIANE: No lymphadenopathy.  CHEST WALL AND LOWER NECK: Within normal limits.  VISUALIZED UPPER ABDOMEN: Within normal limits.  BONES: Mild degenerative changes of the spine.    IMPRESSION:     Limited visualization of the pulmonary arterial branches secondary to   respiratory motion artifact and poor opacification of the main pulmonary   artery. Nondiagnostic study.    If there is further clinical concern for pulmonary embolism, a   ventilation/perfusion scan can be obtained.          < end of copied text >

## 2018-07-03 NOTE — PROGRESS NOTE ADULT - PROBLEM SELECTOR PLAN 1
pt with fevers x 1 day, Tm 100.8 in ED with tachycardia and hypoxia  given right leg cellulitis, diagnostic more consistent with sepsis secondary to RLE cellulitis.   - CT A non diagnostic of PE but his tachycardia and hypoxia improves.   - morbidly obese with a possible component of sleep apnea and obesity hypoventilation syndrome.   - d/w pt and to have outpt sleep studies.  b/l LE dopplers neg for DVT.   leukocytosis improving, cultures negative.  c/w Unasyn for LE cellulitis.  ID f/u appreciated.

## 2018-07-03 NOTE — PROGRESS NOTE ADULT - PROBLEM SELECTOR PLAN 1
secondary to cellulitis  resolved, no fevers > 24 hours  leukocytosis down rapidly  blood cx negative  clinically improved   cont to elevated leg while seated  dopplers pending  can change to augmentin 875 mg bid til July 9  side affects discussed similar to unasyn.

## 2018-07-03 NOTE — PROGRESS NOTE ADULT - PROBLEM SELECTOR PLAN 3
see above.   doubt PE.   resolved tachycardia.   hypoxia likely poor effort and a component of obesity hypoventilation syndrome.   monitor for now.

## 2018-07-03 NOTE — PROGRESS NOTE ADULT - ASSESSMENT
48 M w PMHx GERD p/w fevers x 1 day, hypoxic, febrile and tachycardic in ED, with imaging showing no PE or infectious etiology - admitted for further work-up.

## 2018-07-04 ENCOUNTER — TRANSCRIPTION ENCOUNTER (OUTPATIENT)
Age: 48
End: 2018-07-04

## 2018-07-04 VITALS
SYSTOLIC BLOOD PRESSURE: 113 MMHG | TEMPERATURE: 98 F | DIASTOLIC BLOOD PRESSURE: 75 MMHG | HEART RATE: 75 BPM | OXYGEN SATURATION: 95 % | RESPIRATION RATE: 18 BRPM

## 2018-07-04 LAB
ANION GAP SERPL CALC-SCNC: 13 MMOL/L — SIGNIFICANT CHANGE UP (ref 5–17)
BASOPHILS # BLD AUTO: 0.02 K/UL — SIGNIFICANT CHANGE UP (ref 0–0.2)
BASOPHILS NFR BLD AUTO: 0.3 % — SIGNIFICANT CHANGE UP (ref 0–2)
BUN SERPL-MCNC: 12 MG/DL — SIGNIFICANT CHANGE UP (ref 7–23)
CALCIUM SERPL-MCNC: 8.8 MG/DL — SIGNIFICANT CHANGE UP (ref 8.4–10.5)
CHLORIDE SERPL-SCNC: 104 MMOL/L — SIGNIFICANT CHANGE UP (ref 96–108)
CO2 SERPL-SCNC: 24 MMOL/L — SIGNIFICANT CHANGE UP (ref 22–31)
CREAT SERPL-MCNC: 0.8 MG/DL — SIGNIFICANT CHANGE UP (ref 0.5–1.3)
EOSINOPHIL # BLD AUTO: 0.52 K/UL — HIGH (ref 0–0.5)
EOSINOPHIL NFR BLD AUTO: 7.2 % — HIGH (ref 0–6)
GLUCOSE SERPL-MCNC: 97 MG/DL — SIGNIFICANT CHANGE UP (ref 70–99)
HCT VFR BLD CALC: 40.8 % — SIGNIFICANT CHANGE UP (ref 39–50)
HGB BLD-MCNC: 13.1 G/DL — SIGNIFICANT CHANGE UP (ref 13–17)
IMM GRANULOCYTES NFR BLD AUTO: 0.3 % — SIGNIFICANT CHANGE UP (ref 0–1.5)
LEGIONELLA AG UR QL: NEGATIVE — SIGNIFICANT CHANGE UP
LYMPHOCYTES # BLD AUTO: 1.74 K/UL — SIGNIFICANT CHANGE UP (ref 1–3.3)
LYMPHOCYTES # BLD AUTO: 24.2 % — SIGNIFICANT CHANGE UP (ref 13–44)
MCHC RBC-ENTMCNC: 28.8 PG — SIGNIFICANT CHANGE UP (ref 27–34)
MCHC RBC-ENTMCNC: 32.1 GM/DL — SIGNIFICANT CHANGE UP (ref 32–36)
MCV RBC AUTO: 89.7 FL — SIGNIFICANT CHANGE UP (ref 80–100)
MONOCYTES # BLD AUTO: 0.65 K/UL — SIGNIFICANT CHANGE UP (ref 0–0.9)
MONOCYTES NFR BLD AUTO: 9 % — SIGNIFICANT CHANGE UP (ref 2–14)
NEUTROPHILS # BLD AUTO: 4.25 K/UL — SIGNIFICANT CHANGE UP (ref 1.8–7.4)
NEUTROPHILS NFR BLD AUTO: 59 % — SIGNIFICANT CHANGE UP (ref 43–77)
PLATELET # BLD AUTO: 246 K/UL — SIGNIFICANT CHANGE UP (ref 150–400)
POTASSIUM SERPL-MCNC: 4.3 MMOL/L — SIGNIFICANT CHANGE UP (ref 3.5–5.3)
POTASSIUM SERPL-SCNC: 4.3 MMOL/L — SIGNIFICANT CHANGE UP (ref 3.5–5.3)
RBC # BLD: 4.55 M/UL — SIGNIFICANT CHANGE UP (ref 4.2–5.8)
RBC # FLD: 13.9 % — SIGNIFICANT CHANGE UP (ref 10.3–14.5)
S PNEUM AG SER QL: SIGNIFICANT CHANGE UP
SODIUM SERPL-SCNC: 141 MMOL/L — SIGNIFICANT CHANGE UP (ref 135–145)
WBC # BLD: 7.2 K/UL — SIGNIFICANT CHANGE UP (ref 3.8–10.5)
WBC # FLD AUTO: 7.2 K/UL — SIGNIFICANT CHANGE UP (ref 3.8–10.5)

## 2018-07-04 PROCEDURE — 80048 BASIC METABOLIC PNL TOTAL CA: CPT

## 2018-07-04 PROCEDURE — 87640 STAPH A DNA AMP PROBE: CPT

## 2018-07-04 PROCEDURE — 82803 BLOOD GASES ANY COMBINATION: CPT

## 2018-07-04 PROCEDURE — 81003 URINALYSIS AUTO W/O SCOPE: CPT

## 2018-07-04 PROCEDURE — 87899 AGENT NOS ASSAY W/OPTIC: CPT

## 2018-07-04 PROCEDURE — 84145 PROCALCITONIN (PCT): CPT

## 2018-07-04 PROCEDURE — 85027 COMPLETE CBC AUTOMATED: CPT

## 2018-07-04 PROCEDURE — 74177 CT ABD & PELVIS W/CONTRAST: CPT

## 2018-07-04 PROCEDURE — 82435 ASSAY OF BLOOD CHLORIDE: CPT

## 2018-07-04 PROCEDURE — 82330 ASSAY OF CALCIUM: CPT

## 2018-07-04 PROCEDURE — 96374 THER/PROPH/DIAG INJ IV PUSH: CPT | Mod: XU

## 2018-07-04 PROCEDURE — 83605 ASSAY OF LACTIC ACID: CPT

## 2018-07-04 PROCEDURE — 84484 ASSAY OF TROPONIN QUANT: CPT

## 2018-07-04 PROCEDURE — 80053 COMPREHEN METABOLIC PANEL: CPT

## 2018-07-04 PROCEDURE — 71046 X-RAY EXAM CHEST 2 VIEWS: CPT

## 2018-07-04 PROCEDURE — 84132 ASSAY OF SERUM POTASSIUM: CPT

## 2018-07-04 PROCEDURE — 71275 CT ANGIOGRAPHY CHEST: CPT

## 2018-07-04 PROCEDURE — 85014 HEMATOCRIT: CPT

## 2018-07-04 PROCEDURE — 96375 TX/PRO/DX INJ NEW DRUG ADDON: CPT | Mod: XU

## 2018-07-04 PROCEDURE — 93970 EXTREMITY STUDY: CPT

## 2018-07-04 PROCEDURE — 87581 M.PNEUMON DNA AMP PROBE: CPT

## 2018-07-04 PROCEDURE — 87449 NOS EACH ORGANISM AG IA: CPT

## 2018-07-04 PROCEDURE — 87633 RESP VIRUS 12-25 TARGETS: CPT

## 2018-07-04 PROCEDURE — 82947 ASSAY GLUCOSE BLOOD QUANT: CPT

## 2018-07-04 PROCEDURE — 87641 MR-STAPH DNA AMP PROBE: CPT

## 2018-07-04 PROCEDURE — 87040 BLOOD CULTURE FOR BACTERIA: CPT

## 2018-07-04 PROCEDURE — 84295 ASSAY OF SERUM SODIUM: CPT

## 2018-07-04 PROCEDURE — 99285 EMERGENCY DEPT VISIT HI MDM: CPT | Mod: 25

## 2018-07-04 PROCEDURE — 87486 CHLMYD PNEUM DNA AMP PROBE: CPT

## 2018-07-04 PROCEDURE — 96376 TX/PRO/DX INJ SAME DRUG ADON: CPT | Mod: XU

## 2018-07-04 PROCEDURE — 87798 DETECT AGENT NOS DNA AMP: CPT

## 2018-07-04 RX ADMIN — Medication 1 TABLET(S): at 06:01

## 2018-07-04 RX ADMIN — PANTOPRAZOLE SODIUM 40 MILLIGRAM(S): 20 TABLET, DELAYED RELEASE ORAL at 06:01

## 2018-07-04 NOTE — DISCHARGE NOTE ADULT - HOSPITAL COURSE
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 Pt evaluted by ID, started on zosyn.     Fever resolved, no fevers > 24 hours  leukocytosis down rapidly  blood cx negative  clinically improved   dopplers negative  can change to augmentin 875 mg bid til July 9  side affects discussed similar to unasyn.      Problem/Plan - 2:  ·  Problem: Hypoxia.  Plan: resolved   pt pulse ox 92-98% on RA.     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 Pt evaluted by ID, started on zosyn.   	  Fever resolved, no fevers > 24 hours  leukocytosis down rapidly  blood cx negative  clinically improved   dopplers negative  can change to augmentin 875 mg bid til July 9  side affects discussed similar to unasyn.      Problem/Plan - 2:  ·  Problem: Hypoxia.  Plan: resolved   pt pulse ox 92-98% on RA.     Discharged home to follow up with PMD

## 2018-07-04 NOTE — DISCHARGE NOTE ADULT - CARE PLAN
Principal Discharge DX:	Cellulitis  Goal:	to remain without fever and or worsening symptoms of infection  Assessment and plan of treatment:	Continue augmentin though 7/9 , follow up with PMD next week  cont to elevated leg while seated  Secondary Diagnosis:	GERD (gastroesophageal reflux disease)  Assessment and plan of treatment:	Continue current medication, follow up with PMD/GI Principal Discharge DX:	Cellulitis  Goal:	to remain without fever and or worsening symptoms of infection  Assessment and plan of treatment:	Continue augmentin though 7/9 , follow up with PMD next week  continue to elevated leg while seated. Monitor leg for increased redness, pain and swelling. See Dr. Lopez in 1 week  Secondary Diagnosis:	GERD (gastroesophageal reflux disease)  Assessment and plan of treatment:	Continue current medication, follow up with PMD/GI

## 2018-07-04 NOTE — PROGRESS NOTE ADULT - SUBJECTIVE AND OBJECTIVE BOX
infectious diseases progress note:    BARAK TURNER is a 48y y. o. Male patient    Patient reports: doing much better and ready to go home today    ROS:    EYES:  Negative  blurry vision or double vision  GASTROINTESTINAL:  Negative for nausea, vomiting, diarrhea  -otherwise negative except for subjective    Allergies    No Known Allergies    Intolerances        ANTIBIOTICS/RELEVANT:  antimicrobials  amoxicillin  875 milliGRAM(s)/clavulanate 1 Tablet(s) Oral two times a day    immunologic:    OTHER:  acetaminophen   Tablet 650 milliGRAM(s) Oral every 6 hours PRN  pantoprazole    Tablet 40 milliGRAM(s) Oral before breakfast      Objective:  Vital Signs Last 24 Hrs  T(C): 36.8 (04 Jul 2018 05:56), Max: 37.1 (03 Jul 2018 21:25)  T(F): 98.3 (04 Jul 2018 05:56), Max: 98.7 (03 Jul 2018 21:25)  HR: 69 (04 Jul 2018 05:56) (69 - 77)  BP: 131/82 (04 Jul 2018 05:56) (119/78 - 131/82)  BP(mean): --  RR: 18 (04 Jul 2018 05:56) (18 - 18)  SpO2: 95% (04 Jul 2018 05:56) (93% - 95%)    T(C): 36.8 (07-04-18 @ 05:56), Max: 37.5 (07-02-18 @ 09:38)  T(C): 36.8 (07-04-18 @ 05:56), Max: 38.2 (07-01-18 @ 14:24)  T(C): 36.8 (07-04-18 @ 05:56), Max: 38.2 (07-01-18 @ 14:24)    PHYSICAL EXAM:  Constitutional: Well-developed, well nourished  Eyes: PERRLA, EOMI  Ear/Nose/Throat: oropharynx normal	  Neck: no JVD, no lymphadenopathy, supple  Respiratory: no accessory muscle use  Cardiovascular: RRR,   Gastrointestinal: soft, NT  Extremities: no clubbing, no cyanosis, rt LE with some erythema and patchy irregular color      LABS:                        13.3   10.6  )-----------( 249      ( 03 Jul 2018 08:38 )             42.6       10.6 07-03 @ 08:38  25.44 07-02 @ 09:29  18.2 07-01 @ 15:18      07-03    140  |  102  |  13  ----------------------------<  105<H>  3.8   |  26  |  0.85    Ca    8.7      03 Jul 2018 08:44        Creatinine, Serum: 0.85 mg/dL (07-03-18 @ 08:44)  Creatinine, Serum: 0.91 mg/dL (07-02-18 @ 07:31)  Creatinine, Serum: 0.95 mg/dL (07-01-18 @ 15:18)      MICROBIOLOGY:      Culture - Blood (collected 01 Jul 2018 16:50)  Source: .Blood Blood-Peripheral  Preliminary Report (02 Jul 2018 17:02):    No growth to date.    Culture - Blood (collected 01 Jul 2018 16:50)  Source: .Blood Blood-Peripheral  Preliminary Report (02 Jul 2018 17:02):    No growth to date.        RADIOLOGY & ADDITIONAL STUDIES:

## 2018-07-04 NOTE — PROGRESS NOTE ADULT - PROBLEM SELECTOR PLAN 2
resolved   pt pulse ox 92-98% on RA    From an ID standpoint no further requirement for inpatient status for the management of ID issues. Fine with discharge from ID standpoint when other medical issues no longer require inpatient care and social issues allow for a safe discharge plan.    Thank you for consulting us and involving us in the management of this most interesting and challenging case.     Please Call with any further questions

## 2018-07-04 NOTE — DISCHARGE NOTE ADULT - PLAN OF CARE
to remain without fever and or worsening symptoms of infection Continue augmentin though 7/9 , follow up with PMD next week  cont to elevated leg while seated Continue current medication, follow up with PMD/GI Continue augmentin though 7/9 , follow up with PMD next week  continue to elevated leg while seated. Monitor leg for increased redness, pain and swelling. See Dr. Lopez in 1 week

## 2018-07-04 NOTE — CHART NOTE - NSCHARTNOTEFT_GEN_A_CORE
Patient states right leg improving, Cleared by ID for discharge. Plan discussed with Dr. Earl who agrees. Augementin x 5 more days. Pt to follow up with Dr. Lopez

## 2018-07-04 NOTE — PROGRESS NOTE ADULT - ASSESSMENT
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

## 2018-07-04 NOTE — PROGRESS NOTE ADULT - PROBLEM SELECTOR PLAN 1
secondary to cellulitis  resolved, no fevers > 24 hours  leukocytosis down rapidly  blood cx negative  clinically improved   cont to elevated leg while seated  dopplers pending  Recommend augmentin 875 mg PO bid til July 9

## 2018-07-04 NOTE — DISCHARGE NOTE ADULT - CARE PROVIDER_API CALL
Alexander oLpez), Cardiovascular Disease; Internal Medicine  [address] [place] [address]  Phone: [phone]  Fax: [phone]

## 2018-07-04 NOTE — DISCHARGE NOTE ADULT - PATIENT PORTAL LINK FT
You can access the [facility], offered by [facility], by registering with the following website: [email or web address]

## 2018-07-04 NOTE — DISCHARGE NOTE ADULT - MEDICATION SUMMARY - MEDICATIONS TO TAKE
I will START or STAY ON the medications listed below when I get home from the hospital:    amoxicillin-clavulanate 875 mg-125 mg oral tablet  -- 1 tab(s) by mouth 2 times a day  -- Indication: For Cellulitis    Dexilant 30 mg oral delayed release capsule  -- 1 cap(s) by mouth once a day  -- Indication: For GERD (gastroesophageal reflux disease)

## 2018-07-06 LAB
CULTURE RESULTS: SIGNIFICANT CHANGE UP
CULTURE RESULTS: SIGNIFICANT CHANGE UP
SPECIMEN SOURCE: SIGNIFICANT CHANGE UP
SPECIMEN SOURCE: SIGNIFICANT CHANGE UP

## 2019-04-23 ENCOUNTER — EMERGENCY (EMERGENCY)
Facility: HOSPITAL | Age: 49
LOS: 1 days | Discharge: HOME | End: 2019-04-23
Attending: EMERGENCY MEDICINE
Payer: MEDICAID

## 2019-04-23 VITALS
OXYGEN SATURATION: 96 % | TEMPERATURE: 98 F | RESPIRATION RATE: 18 BRPM | HEIGHT: 69 IN | DIASTOLIC BLOOD PRESSURE: 95 MMHG | SYSTOLIC BLOOD PRESSURE: 160 MMHG | HEART RATE: 95 BPM | WEIGHT: 315 LBS

## 2019-04-23 VITALS
TEMPERATURE: 98 F | SYSTOLIC BLOOD PRESSURE: 150 MMHG | OXYGEN SATURATION: 95 % | DIASTOLIC BLOOD PRESSURE: 84 MMHG | RESPIRATION RATE: 19 BRPM | HEART RATE: 81 BPM

## 2019-04-23 LAB
ALBUMIN SERPL ELPH-MCNC: 4.4 G/DL — SIGNIFICANT CHANGE UP (ref 3.3–5)
ALP SERPL-CCNC: 55 U/L — SIGNIFICANT CHANGE UP (ref 40–120)
ALT FLD-CCNC: 32 U/L — SIGNIFICANT CHANGE UP (ref 10–45)
ANION GAP SERPL CALC-SCNC: 10 MMOL/L — SIGNIFICANT CHANGE UP (ref 5–17)
APPEARANCE UR: CLEAR — SIGNIFICANT CHANGE UP
APTT BLD: 32.1 SEC — SIGNIFICANT CHANGE UP (ref 27.5–36.3)
AST SERPL-CCNC: 16 U/L — SIGNIFICANT CHANGE UP (ref 10–40)
BASOPHILS # BLD AUTO: 0.1 K/UL — SIGNIFICANT CHANGE UP (ref 0–0.2)
BASOPHILS NFR BLD AUTO: 0.4 % — SIGNIFICANT CHANGE UP (ref 0–2)
BILIRUB SERPL-MCNC: 0.4 MG/DL — SIGNIFICANT CHANGE UP (ref 0.2–1.2)
BILIRUB UR-MCNC: NEGATIVE — SIGNIFICANT CHANGE UP
BUN SERPL-MCNC: 12 MG/DL — SIGNIFICANT CHANGE UP (ref 7–23)
CALCIUM SERPL-MCNC: 9.8 MG/DL — SIGNIFICANT CHANGE UP (ref 8.4–10.5)
CHLORIDE SERPL-SCNC: 102 MMOL/L — SIGNIFICANT CHANGE UP (ref 96–108)
CO2 SERPL-SCNC: 27 MMOL/L — SIGNIFICANT CHANGE UP (ref 22–31)
COLOR SPEC: SIGNIFICANT CHANGE UP
CREAT SERPL-MCNC: 0.86 MG/DL — SIGNIFICANT CHANGE UP (ref 0.5–1.3)
D DIMER BLD IA.RAPID-MCNC: 227 NG/ML DDU — SIGNIFICANT CHANGE UP
DIFF PNL FLD: NEGATIVE — SIGNIFICANT CHANGE UP
EOSINOPHIL # BLD AUTO: 0.5 K/UL — SIGNIFICANT CHANGE UP (ref 0–0.5)
EOSINOPHIL NFR BLD AUTO: 3.9 % — SIGNIFICANT CHANGE UP (ref 0–6)
GAS PNL BLDV: SIGNIFICANT CHANGE UP
GLUCOSE SERPL-MCNC: 90 MG/DL — SIGNIFICANT CHANGE UP (ref 70–99)
GLUCOSE UR QL: NEGATIVE — SIGNIFICANT CHANGE UP
HCT VFR BLD CALC: 47 % — SIGNIFICANT CHANGE UP (ref 39–50)
HGB BLD-MCNC: 15.5 G/DL — SIGNIFICANT CHANGE UP (ref 13–17)
INR BLD: 1.04 RATIO — SIGNIFICANT CHANGE UP (ref 0.88–1.16)
KETONES UR-MCNC: NEGATIVE — SIGNIFICANT CHANGE UP
LEUKOCYTE ESTERASE UR-ACNC: NEGATIVE — SIGNIFICANT CHANGE UP
LYMPHOCYTES # BLD AUTO: 2.8 K/UL — SIGNIFICANT CHANGE UP (ref 1–3.3)
LYMPHOCYTES # BLD AUTO: 22.9 % — SIGNIFICANT CHANGE UP (ref 13–44)
MAGNESIUM SERPL-MCNC: 2.2 MG/DL — SIGNIFICANT CHANGE UP (ref 1.6–2.6)
MCHC RBC-ENTMCNC: 29.4 PG — SIGNIFICANT CHANGE UP (ref 27–34)
MCHC RBC-ENTMCNC: 32.9 GM/DL — SIGNIFICANT CHANGE UP (ref 32–36)
MCV RBC AUTO: 89.3 FL — SIGNIFICANT CHANGE UP (ref 80–100)
MONOCYTES # BLD AUTO: 0.8 K/UL — SIGNIFICANT CHANGE UP (ref 0–0.9)
MONOCYTES NFR BLD AUTO: 6.6 % — SIGNIFICANT CHANGE UP (ref 2–14)
NEUTROPHILS # BLD AUTO: 8.2 K/UL — HIGH (ref 1.8–7.4)
NEUTROPHILS NFR BLD AUTO: 66.1 % — SIGNIFICANT CHANGE UP (ref 43–77)
NITRITE UR-MCNC: NEGATIVE — SIGNIFICANT CHANGE UP
NT-PROBNP SERPL-SCNC: 61 PG/ML — SIGNIFICANT CHANGE UP (ref 0–300)
PH UR: 6 — SIGNIFICANT CHANGE UP (ref 5–8)
PHOSPHATE SERPL-MCNC: 3.6 MG/DL — SIGNIFICANT CHANGE UP (ref 2.5–4.5)
PLATELET # BLD AUTO: 318 K/UL — SIGNIFICANT CHANGE UP (ref 150–400)
POTASSIUM SERPL-MCNC: 4.2 MMOL/L — SIGNIFICANT CHANGE UP (ref 3.5–5.3)
POTASSIUM SERPL-SCNC: 4.2 MMOL/L — SIGNIFICANT CHANGE UP (ref 3.5–5.3)
PROT SERPL-MCNC: 7.8 G/DL — SIGNIFICANT CHANGE UP (ref 6–8.3)
PROT UR-MCNC: NEGATIVE — SIGNIFICANT CHANGE UP
PROTHROM AB SERPL-ACNC: 11.9 SEC — SIGNIFICANT CHANGE UP (ref 10–12.9)
RBC # BLD: 5.27 M/UL — SIGNIFICANT CHANGE UP (ref 4.2–5.8)
RBC # FLD: 12.5 % — SIGNIFICANT CHANGE UP (ref 10.3–14.5)
SODIUM SERPL-SCNC: 139 MMOL/L — SIGNIFICANT CHANGE UP (ref 135–145)
SP GR SPEC: 1.02 — SIGNIFICANT CHANGE UP (ref 1.01–1.02)
TROPONIN T, HIGH SENSITIVITY RESULT: <6 NG/L — SIGNIFICANT CHANGE UP (ref 0–51)
UROBILINOGEN FLD QL: NEGATIVE — SIGNIFICANT CHANGE UP
WBC # BLD: 12.4 K/UL — HIGH (ref 3.8–10.5)
WBC # FLD AUTO: 12.4 K/UL — HIGH (ref 3.8–10.5)

## 2019-04-23 PROCEDURE — 96374 THER/PROPH/DIAG INJ IV PUSH: CPT

## 2019-04-23 PROCEDURE — 83880 ASSAY OF NATRIURETIC PEPTIDE: CPT

## 2019-04-23 PROCEDURE — 71045 X-RAY EXAM CHEST 1 VIEW: CPT

## 2019-04-23 PROCEDURE — 70450 CT HEAD/BRAIN W/O DYE: CPT | Mod: 26

## 2019-04-23 PROCEDURE — 99285 EMERGENCY DEPT VISIT HI MDM: CPT | Mod: 25

## 2019-04-23 PROCEDURE — 99284 EMERGENCY DEPT VISIT MOD MDM: CPT | Mod: 25

## 2019-04-23 PROCEDURE — 84100 ASSAY OF PHOSPHORUS: CPT

## 2019-04-23 PROCEDURE — 85027 COMPLETE CBC AUTOMATED: CPT

## 2019-04-23 PROCEDURE — 81003 URINALYSIS AUTO W/O SCOPE: CPT

## 2019-04-23 PROCEDURE — 82947 ASSAY GLUCOSE BLOOD QUANT: CPT

## 2019-04-23 PROCEDURE — 84484 ASSAY OF TROPONIN QUANT: CPT

## 2019-04-23 PROCEDURE — 85730 THROMBOPLASTIN TIME PARTIAL: CPT

## 2019-04-23 PROCEDURE — 85014 HEMATOCRIT: CPT

## 2019-04-23 PROCEDURE — 71045 X-RAY EXAM CHEST 1 VIEW: CPT | Mod: 26

## 2019-04-23 PROCEDURE — 82330 ASSAY OF CALCIUM: CPT

## 2019-04-23 PROCEDURE — 70450 CT HEAD/BRAIN W/O DYE: CPT

## 2019-04-23 PROCEDURE — 82435 ASSAY OF BLOOD CHLORIDE: CPT

## 2019-04-23 PROCEDURE — 84132 ASSAY OF SERUM POTASSIUM: CPT

## 2019-04-23 PROCEDURE — 85610 PROTHROMBIN TIME: CPT

## 2019-04-23 PROCEDURE — 85379 FIBRIN DEGRADATION QUANT: CPT

## 2019-04-23 PROCEDURE — 83735 ASSAY OF MAGNESIUM: CPT

## 2019-04-23 PROCEDURE — 93005 ELECTROCARDIOGRAM TRACING: CPT

## 2019-04-23 PROCEDURE — 93010 ELECTROCARDIOGRAM REPORT: CPT

## 2019-04-23 PROCEDURE — 84295 ASSAY OF SERUM SODIUM: CPT

## 2019-04-23 PROCEDURE — 83605 ASSAY OF LACTIC ACID: CPT

## 2019-04-23 PROCEDURE — 82803 BLOOD GASES ANY COMBINATION: CPT

## 2019-04-23 PROCEDURE — 80053 COMPREHEN METABOLIC PANEL: CPT

## 2019-04-23 RX ORDER — METOCLOPRAMIDE HCL 10 MG
10 TABLET ORAL ONCE
Qty: 0 | Refills: 0 | Status: COMPLETED | OUTPATIENT
Start: 2019-04-23 | End: 2019-04-23

## 2019-04-23 RX ORDER — ACETAMINOPHEN 500 MG
975 TABLET ORAL ONCE
Qty: 0 | Refills: 0 | Status: COMPLETED | OUTPATIENT
Start: 2019-04-23 | End: 2019-04-23

## 2019-04-23 RX ADMIN — Medication 10 MILLIGRAM(S): at 19:55

## 2019-04-23 RX ADMIN — Medication 975 MILLIGRAM(S): at 19:55

## 2019-04-23 NOTE — ED ADULT NURSE NOTE - OBJECTIVE STATEMENT
50 yo male presents to ED with feeling sick and tired this morning.  Pt states last night he felt SOB, and had difficulty sleeping.  woke up and felt tired and weak.  he stated he urined thtoday around 15-17 times and had sweats.  overall not feeling 100% 48 yo male presents to ED with feeling sick and tired this morning.  Pt states last night he felt SOB, and had difficulty sleeping.  woke up and felt tired and weak.  he stated he urined today around 15-17 times and had sweats.  overall not feeling 100%.  pt is complainign of HA SOB on eexertion, weakness, dizziness, chills, leg pain, swelling, abd pressure and malaise.  pt states he had cellulitis last year on his right leg.  pt denies fevers, NVD.  PT AOx4, PERRL, lungs clear and equal bilat, abdomen soft non tender. PMS intact in all extremities, +2 pitting edema in lower extremities Full ROM in all extremities.  pt resting comfortably in bed educated to call for assistance or any worseing symptoms.

## 2019-04-23 NOTE — ED PROVIDER NOTE - OBJECTIVE STATEMENT
49yoM hx of GERD pw 1 day of multiple complaints. This includes chest pain, sob on exertion, headache, weakness, dizziness, chills, leg pain, swelling, abd pressure, and general malaise. Headache is throbbing, frontal, slow onset, worsening this afternoon, not improving with home meds. Wife said patient had similar presentation last year and had cellulitis. Denies fevers, chills, nausea, vomiting, diarrhea, weakness, tingling, slurred speech, visual changes, rash, or other issues.

## 2019-04-23 NOTE — ED PROVIDER NOTE - CLINICAL SUMMARY MEDICAL DECISION MAKING FREE TEXT BOX
male with multiple complaints, will send labs, trop, bnp, d dimer, ekg, xcr, urinalysis, trop, and reass. if negative workup, patient appears well with good follow up, will dc home.

## 2019-04-23 NOTE — ED PROVIDER NOTE - ATTENDING CONTRIBUTION TO CARE
Private Physician Alexander Lopez PCP  49y male PMH Gerd, No dm,htn,hld,cancer,asthma,cva,mi/cad,surgery,travel. Pt employed as salesman. Pt  Last admitted 7/2018 dx cellulitis leg, Pt comes to ed complains of awoke this am not feeling "100%" body aches, fatigued, with sweats and increased urination. No dysiuria, Also complains of chest tightness  across left chest nonradiatiing, symptoms worsened at 330pm with feeling off balance. no vertigo. No nvdc.no cough hemoptysis. PE WDWN male mod obese normocephalic atraumatic chest clear anterior & posterior abd soft +bs no mass guarding cv no rubs, gallops or murmurs, neruo no focal defects  Eh Monet MD, Facep

## 2019-04-23 NOTE — ED ADULT NURSE REASSESSMENT NOTE - NS ED NURSE REASSESS COMMENT FT1
Family expressed concern over patient.  As per RN assessment, Pt is A/oX4 PERRl wnl, guadalupe with equal strength.  normal sensation in face and upper extremities.  Denies chest pain or sob.  C/o headache near temples b/l.

## 2019-04-23 NOTE — ED PROVIDER NOTE - NSFOLLOWUPINSTRUCTIONS_ED_ALL_ED_FT
1) Follow up with your doctor in 1 -2 days. Call for an appointment.   2) Return to the ER immediately for new or worsening symptoms including uncontrollable chest pain, vomiting or other issues   3) Take Tylenol 975mg every 6 hours as needed for headache.

## 2019-04-23 NOTE — ED ADULT NURSE NOTE - NSIMPLEMENTINTERV_GEN_ALL_ED
Implemented All Universal Safety Interventions:  [place] to call system. Call bell, personal items and telephone within reach. Instruct patient to call for assistance. Room bathroom lighting operational. Non-slip footwear when patient is off stretcher. Physically safe environment: no spills, clutter or unnecessary equipment. Stretcher in lowest position, wheels locked, appropriate side rails in place.

## 2019-04-23 NOTE — ED PROVIDER NOTE - PROGRESS NOTE DETAILS
Jose CLEMENTE - patients symptoms improved after tylenol and reglan. discussed results with patient. will dc home with close follow up. Patient able to follow up with PMD tomorrow likely.

## 2019-04-24 PROBLEM — K21.9 GASTRO-ESOPHAGEAL REFLUX DISEASE WITHOUT ESOPHAGITIS: Chronic | Status: ACTIVE | Noted: 2018-07-02

## 2019-04-29 NOTE — ED PROVIDER NOTE - GENITOURINARY, MLM
Addended by: DAMASO JONES on: 4/29/2019 10:30 AM     Modules accepted: Orders    
No discharge, lesions.

## 2020-01-30 ENCOUNTER — INPATIENT (INPATIENT)
Facility: HOSPITAL | Age: 50
LOS: 0 days | Discharge: HOME | DRG: 313 | End: 2020-01-31
Attending: INTERNAL MEDICINE | Admitting: INTERNAL MEDICINE
Payer: COMMERCIAL

## 2020-01-30 VITALS
HEIGHT: 69 IN | HEART RATE: 88 BPM | OXYGEN SATURATION: 96 % | DIASTOLIC BLOOD PRESSURE: 69 MMHG | TEMPERATURE: 98 F | WEIGHT: 315 LBS | RESPIRATION RATE: 18 BRPM | SYSTOLIC BLOOD PRESSURE: 137 MMHG

## 2020-01-30 DIAGNOSIS — R07.9 CHEST PAIN, UNSPECIFIED: ICD-10-CM

## 2020-01-30 LAB
ALBUMIN SERPL ELPH-MCNC: 4 G/DL — SIGNIFICANT CHANGE UP (ref 3.3–5)
ALP SERPL-CCNC: 66 U/L — SIGNIFICANT CHANGE UP (ref 40–120)
ALT FLD-CCNC: 29 U/L — SIGNIFICANT CHANGE UP (ref 10–45)
ANION GAP SERPL CALC-SCNC: 13 MMOL/L — SIGNIFICANT CHANGE UP (ref 5–17)
APTT BLD: 33.2 SEC — SIGNIFICANT CHANGE UP (ref 27.5–36.3)
AST SERPL-CCNC: 14 U/L — SIGNIFICANT CHANGE UP (ref 10–40)
BILIRUB SERPL-MCNC: 0.3 MG/DL — SIGNIFICANT CHANGE UP (ref 0.2–1.2)
BUN SERPL-MCNC: 16 MG/DL — SIGNIFICANT CHANGE UP (ref 7–23)
CALCIUM SERPL-MCNC: 9.1 MG/DL — SIGNIFICANT CHANGE UP (ref 8.4–10.5)
CHLORIDE SERPL-SCNC: 104 MMOL/L — SIGNIFICANT CHANGE UP (ref 96–108)
CO2 SERPL-SCNC: 25 MMOL/L — SIGNIFICANT CHANGE UP (ref 22–31)
CREAT SERPL-MCNC: 0.8 MG/DL — SIGNIFICANT CHANGE UP (ref 0.5–1.3)
GLUCOSE SERPL-MCNC: 104 MG/DL — HIGH (ref 70–99)
HCT VFR BLD CALC: 43.8 % — SIGNIFICANT CHANGE UP (ref 39–50)
HGB BLD-MCNC: 14.4 G/DL — SIGNIFICANT CHANGE UP (ref 13–17)
INR BLD: 1.03 RATIO — SIGNIFICANT CHANGE UP (ref 0.88–1.16)
LIDOCAIN IGE QN: 25 U/L — SIGNIFICANT CHANGE UP (ref 7–60)
MCHC RBC-ENTMCNC: 29.1 PG — SIGNIFICANT CHANGE UP (ref 27–34)
MCHC RBC-ENTMCNC: 32.9 GM/DL — SIGNIFICANT CHANGE UP (ref 32–36)
MCV RBC AUTO: 88.7 FL — SIGNIFICANT CHANGE UP (ref 80–100)
NRBC # BLD: 0 /100 WBCS — SIGNIFICANT CHANGE UP (ref 0–0)
PLATELET # BLD AUTO: 310 K/UL — SIGNIFICANT CHANGE UP (ref 150–400)
POTASSIUM SERPL-MCNC: 4.3 MMOL/L — SIGNIFICANT CHANGE UP (ref 3.5–5.3)
POTASSIUM SERPL-SCNC: 4.3 MMOL/L — SIGNIFICANT CHANGE UP (ref 3.5–5.3)
PROT SERPL-MCNC: 7.1 G/DL — SIGNIFICANT CHANGE UP (ref 6–8.3)
PROTHROM AB SERPL-ACNC: 11.9 SEC — SIGNIFICANT CHANGE UP (ref 10–12.9)
RBC # BLD: 4.94 M/UL — SIGNIFICANT CHANGE UP (ref 4.2–5.8)
RBC # FLD: 13.1 % — SIGNIFICANT CHANGE UP (ref 10.3–14.5)
SODIUM SERPL-SCNC: 142 MMOL/L — SIGNIFICANT CHANGE UP (ref 135–145)
TROPONIN T, HIGH SENSITIVITY RESULT: <6 NG/L — SIGNIFICANT CHANGE UP (ref 0–51)
WBC # BLD: 11.39 K/UL — HIGH (ref 3.8–10.5)
WBC # FLD AUTO: 11.39 K/UL — HIGH (ref 3.8–10.5)

## 2020-01-30 PROCEDURE — 93010 ELECTROCARDIOGRAM REPORT: CPT | Mod: NC

## 2020-01-30 PROCEDURE — 99285 EMERGENCY DEPT VISIT HI MDM: CPT

## 2020-01-30 PROCEDURE — 71046 X-RAY EXAM CHEST 2 VIEWS: CPT | Mod: 26

## 2020-01-30 RX ORDER — ASPIRIN/CALCIUM CARB/MAGNESIUM 324 MG
324 TABLET ORAL ONCE
Refills: 0 | Status: COMPLETED | OUTPATIENT
Start: 2020-01-30 | End: 2020-01-30

## 2020-01-30 RX ORDER — FAMOTIDINE 10 MG/ML
20 INJECTION INTRAVENOUS ONCE
Refills: 0 | Status: COMPLETED | OUTPATIENT
Start: 2020-01-30 | End: 2020-01-30

## 2020-01-30 RX ADMIN — Medication 324 MILLIGRAM(S): at 21:39

## 2020-01-30 RX ADMIN — FAMOTIDINE 20 MILLIGRAM(S): 10 INJECTION INTRAVENOUS at 21:38

## 2020-01-30 RX ADMIN — Medication 30 MILLILITER(S): at 21:38

## 2020-01-30 NOTE — ED PROVIDER NOTE - PHYSICAL EXAMINATION
GEN: Pt morbidly obese, in NAD, non-toxic, A&O x3.  PSYCH: Affect appropriate.  EYES: Sclera white w/o injection.   ENT: Head NCAT. Nose w/o deformity. No auricular TTP. MMM. Neck supple FROM.   RESP: No chest wall tenderness, CTA b/l, no wheezes, rales, or rhonchi.   CARDIAC: RRR, clear distinct S1, S2, no appreciable murmurs.  ABD: Abdomen soft, non-tender. No CVAT b/l.  VASC: 2+ radial and dorsalis pedis pulses b/l. No edema or calf tenderness.  MSK: FROM b/l UE without pain. No pain with resisted movement of the upper extremities including pressing and horizontal ABduction.  SKIN: No rashes on the trunk.

## 2020-01-30 NOTE — ED ADULT NURSE NOTE - OBJECTIVE STATEMENT
50 yo male from home A&OX4 c/o chx discomfort radiating to neck and left shoulder, denies SOB. Pt st symptoms have been on and off over the last several days, no exacerbating or alleviating symptoms. Pt denies fever/recent illness, afebrile. Pt denies abd pn, n/v/d/dizziness. EKG obtained in triage. IV access obtained in RAC via 18G catheter, labs drawn and sent. Pt medicated. results pending.

## 2020-01-30 NOTE — ED PROVIDER NOTE - OBJECTIVE STATEMENT
48 y/o M PMHx of GERD presents c/o chest pain x 1.5 weeks. Pt notes he has been having intermittent chest pain described as a "stretching" or tightness in the L chest, radiates to the L arm and shoulder, mild-moderate, no clear palliative or provoking factors, occurs most often at night while at rest watching TV sitting upright. CP is non-exertional, non-pleuritic, non-positional, no recent gagging/retching, denies associated SOB, diaphoresis, palpitations, lightheadedness, n/v, or syncope. No Fhx. No prior cardiac hx. Last stress was 1 year ago- normal. Pt denies recent illness, falls/trauma, f/c, ripping/tearing pain, back/abdo pain, rashes, leg pain/edema. Pt d/w his PCP who could not see the pt in several days so referred pt to the ED.

## 2020-01-30 NOTE — ED PROVIDER NOTE - ATTENDING CONTRIBUTION TO CARE
I performed a history and physical exam of the patient and discussed their management with the PA/NP.  I reviewed the ACP's note and agree with the documented findings and plan of care except as noted below. My medical decision making and observations are as follows:    49 M PMHx of GERD presents c/o chest pain x 1.5 weeks. Pt notes he has been having intermittent chest pain described as tightness in the L chest, radiates to the L arm and shoulder, mild-moderate, no clear palliative or provoking factors, occurs most often at night while at rest watching TV sitting upright. CP is non-exertional, non-pleuritic, non-positional.  Pt has not been taking his gerd medications recently due to insurance change, but this feels different to him.  Pt in NAD, heart rrr, lungs cta, abd obese soft ntnd, mild lower ext edema.  I am concerned for ACS given tightness of chest radiating to neck and shoulder - will check labs, trop, ekg, cxr and plan for admission vs cdu.

## 2020-01-30 NOTE — ED PROVIDER NOTE - PROGRESS NOTE DETAILS
Due to pt's habitus, stress would need to be 2 days so patient is not a CDU candidate.  - Jesenia Lance,  labs, images, and plan d/w pt. pt agreeable to admission, all questions answered, spoke to pt wife with pt consent she also agrees with plan. -Vladimir Ibarra PA-C

## 2020-01-31 ENCOUNTER — TRANSCRIPTION ENCOUNTER (OUTPATIENT)
Age: 50
End: 2020-01-31

## 2020-01-31 VITALS
OXYGEN SATURATION: 95 % | TEMPERATURE: 98 F | HEART RATE: 83 BPM | RESPIRATION RATE: 18 BRPM | SYSTOLIC BLOOD PRESSURE: 137 MMHG | DIASTOLIC BLOOD PRESSURE: 80 MMHG

## 2020-01-31 DIAGNOSIS — R07.9 CHEST PAIN, UNSPECIFIED: ICD-10-CM

## 2020-01-31 DIAGNOSIS — R07.89 OTHER CHEST PAIN: ICD-10-CM

## 2020-01-31 DIAGNOSIS — K21.9 GASTRO-ESOPHAGEAL REFLUX DISEASE WITHOUT ESOPHAGITIS: ICD-10-CM

## 2020-01-31 DIAGNOSIS — E66.01 MORBID (SEVERE) OBESITY DUE TO EXCESS CALORIES: ICD-10-CM

## 2020-01-31 LAB
ALBUMIN SERPL ELPH-MCNC: 4.2 G/DL — SIGNIFICANT CHANGE UP (ref 3.3–5)
ALP SERPL-CCNC: 55 U/L — SIGNIFICANT CHANGE UP (ref 40–120)
ALT FLD-CCNC: 26 U/L — SIGNIFICANT CHANGE UP (ref 10–45)
ANION GAP SERPL CALC-SCNC: 13 MMOL/L — SIGNIFICANT CHANGE UP (ref 5–17)
AST SERPL-CCNC: 14 U/L — SIGNIFICANT CHANGE UP (ref 10–40)
BASOPHILS # BLD AUTO: 0.05 K/UL — SIGNIFICANT CHANGE UP (ref 0–0.2)
BASOPHILS NFR BLD AUTO: 0.5 % — SIGNIFICANT CHANGE UP (ref 0–2)
BILIRUB SERPL-MCNC: 0.5 MG/DL — SIGNIFICANT CHANGE UP (ref 0.2–1.2)
BUN SERPL-MCNC: 15 MG/DL — SIGNIFICANT CHANGE UP (ref 7–23)
CALCIUM SERPL-MCNC: 9.4 MG/DL — SIGNIFICANT CHANGE UP (ref 8.4–10.5)
CHLORIDE SERPL-SCNC: 105 MMOL/L — SIGNIFICANT CHANGE UP (ref 96–108)
CO2 SERPL-SCNC: 25 MMOL/L — SIGNIFICANT CHANGE UP (ref 22–31)
CREAT SERPL-MCNC: 0.69 MG/DL — SIGNIFICANT CHANGE UP (ref 0.5–1.3)
EOSINOPHIL # BLD AUTO: 0.55 K/UL — HIGH (ref 0–0.5)
EOSINOPHIL NFR BLD AUTO: 5.4 % — SIGNIFICANT CHANGE UP (ref 0–6)
GLUCOSE SERPL-MCNC: 106 MG/DL — HIGH (ref 70–99)
HCT VFR BLD CALC: 46.5 % — SIGNIFICANT CHANGE UP (ref 39–50)
HGB BLD-MCNC: 15 G/DL — SIGNIFICANT CHANGE UP (ref 13–17)
IMM GRANULOCYTES NFR BLD AUTO: 0.3 % — SIGNIFICANT CHANGE UP (ref 0–1.5)
LYMPHOCYTES # BLD AUTO: 1.64 K/UL — SIGNIFICANT CHANGE UP (ref 1–3.3)
LYMPHOCYTES # BLD AUTO: 16.2 % — SIGNIFICANT CHANGE UP (ref 13–44)
MCHC RBC-ENTMCNC: 28.9 PG — SIGNIFICANT CHANGE UP (ref 27–34)
MCHC RBC-ENTMCNC: 32.3 GM/DL — SIGNIFICANT CHANGE UP (ref 32–36)
MCV RBC AUTO: 89.6 FL — SIGNIFICANT CHANGE UP (ref 80–100)
MONOCYTES # BLD AUTO: 0.73 K/UL — SIGNIFICANT CHANGE UP (ref 0–0.9)
MONOCYTES NFR BLD AUTO: 7.2 % — SIGNIFICANT CHANGE UP (ref 2–14)
NEUTROPHILS # BLD AUTO: 7.11 K/UL — SIGNIFICANT CHANGE UP (ref 1.8–7.4)
NEUTROPHILS NFR BLD AUTO: 70.4 % — SIGNIFICANT CHANGE UP (ref 43–77)
NRBC # BLD: 0 /100 WBCS — SIGNIFICANT CHANGE UP (ref 0–0)
PLATELET # BLD AUTO: 321 K/UL — SIGNIFICANT CHANGE UP (ref 150–400)
POTASSIUM SERPL-MCNC: 4.3 MMOL/L — SIGNIFICANT CHANGE UP (ref 3.5–5.3)
POTASSIUM SERPL-SCNC: 4.3 MMOL/L — SIGNIFICANT CHANGE UP (ref 3.5–5.3)
PROT SERPL-MCNC: 7.8 G/DL — SIGNIFICANT CHANGE UP (ref 6–8.3)
RBC # BLD: 5.19 M/UL — SIGNIFICANT CHANGE UP (ref 4.2–5.8)
RBC # FLD: 13.2 % — SIGNIFICANT CHANGE UP (ref 10.3–14.5)
SODIUM SERPL-SCNC: 143 MMOL/L — SIGNIFICANT CHANGE UP (ref 135–145)
TROPONIN T, HIGH SENSITIVITY RESULT: <6 NG/L — SIGNIFICANT CHANGE UP (ref 0–51)
WBC # BLD: 10.11 K/UL — SIGNIFICANT CHANGE UP (ref 3.8–10.5)
WBC # FLD AUTO: 10.11 K/UL — SIGNIFICANT CHANGE UP (ref 3.8–10.5)

## 2020-01-31 PROCEDURE — 96374 THER/PROPH/DIAG INJ IV PUSH: CPT

## 2020-01-31 PROCEDURE — 99285 EMERGENCY DEPT VISIT HI MDM: CPT | Mod: 25

## 2020-01-31 PROCEDURE — 83690 ASSAY OF LIPASE: CPT

## 2020-01-31 PROCEDURE — 84484 ASSAY OF TROPONIN QUANT: CPT

## 2020-01-31 PROCEDURE — 85027 COMPLETE CBC AUTOMATED: CPT

## 2020-01-31 PROCEDURE — 80053 COMPREHEN METABOLIC PANEL: CPT

## 2020-01-31 PROCEDURE — 93005 ELECTROCARDIOGRAM TRACING: CPT

## 2020-01-31 PROCEDURE — 85730 THROMBOPLASTIN TIME PARTIAL: CPT

## 2020-01-31 PROCEDURE — 71046 X-RAY EXAM CHEST 2 VIEWS: CPT

## 2020-01-31 PROCEDURE — 85610 PROTHROMBIN TIME: CPT

## 2020-01-31 RX ORDER — ATORVASTATIN CALCIUM 80 MG/1
40 TABLET, FILM COATED ORAL AT BEDTIME
Refills: 0 | Status: DISCONTINUED | OUTPATIENT
Start: 2020-01-31 | End: 2020-01-31

## 2020-01-31 RX ORDER — ATORVASTATIN CALCIUM 80 MG/1
1 TABLET, FILM COATED ORAL
Qty: 30 | Refills: 0
Start: 2020-01-31 | End: 2020-02-29

## 2020-01-31 RX ORDER — ACETAMINOPHEN 500 MG
650 TABLET ORAL EVERY 4 HOURS
Refills: 0 | Status: DISCONTINUED | OUTPATIENT
Start: 2020-01-31 | End: 2020-01-31

## 2020-01-31 RX ORDER — ACETAMINOPHEN 500 MG
2 TABLET ORAL
Qty: 0 | Refills: 0 | DISCHARGE
Start: 2020-01-31

## 2020-01-31 RX ORDER — ASPIRIN/CALCIUM CARB/MAGNESIUM 324 MG
81 TABLET ORAL DAILY
Refills: 0 | Status: DISCONTINUED | OUTPATIENT
Start: 2020-01-31 | End: 2020-01-31

## 2020-01-31 RX ORDER — ONDANSETRON 8 MG/1
4 TABLET, FILM COATED ORAL EVERY 6 HOURS
Refills: 0 | Status: DISCONTINUED | OUTPATIENT
Start: 2020-01-31 | End: 2020-01-31

## 2020-01-31 RX ORDER — ASPIRIN/CALCIUM CARB/MAGNESIUM 324 MG
1 TABLET ORAL
Qty: 0 | Refills: 0 | DISCHARGE

## 2020-01-31 RX ORDER — ENOXAPARIN SODIUM 100 MG/ML
40 INJECTION SUBCUTANEOUS DAILY
Refills: 0 | Status: DISCONTINUED | OUTPATIENT
Start: 2020-02-01 | End: 2020-01-31

## 2020-01-31 RX ORDER — SODIUM CHLORIDE 9 MG/ML
1000 INJECTION INTRAMUSCULAR; INTRAVENOUS; SUBCUTANEOUS
Refills: 0 | Status: DISCONTINUED | OUTPATIENT
Start: 2020-01-31 | End: 2020-01-31

## 2020-01-31 RX ORDER — DEXLANSOPRAZOLE 30 MG/1
1 CAPSULE, DELAYED RELEASE ORAL
Qty: 0 | Refills: 0 | DISCHARGE

## 2020-01-31 RX ORDER — ASPIRIN/CALCIUM CARB/MAGNESIUM 324 MG
1 TABLET ORAL
Qty: 30 | Refills: 0
Start: 2020-01-31 | End: 2020-02-29

## 2020-01-31 RX ADMIN — SODIUM CHLORIDE 75 MILLILITER(S): 9 INJECTION INTRAMUSCULAR; INTRAVENOUS; SUBCUTANEOUS at 06:14

## 2020-01-31 NOTE — H&P ADULT - ASSESSMENT
49 yr old male with a pmhx of GERD presents with complaints of worsening chest discomfort for the past week.

## 2020-01-31 NOTE — H&P ADULT - NSHPREVIEWOFSYSTEMS_GEN_ALL_CORE
REVIEW OF SYSTEMS:  General: no weakness, no fever/chills, no weight loss/gain  Skin/Breast: no rash, no jaundice  Ophthalmologic: no vision changes, no dry eyes   Respiratory and Thorax: no cough, no wheezing, no hemoptysis, no dyspnea  Cardiovascular: no chest pain, no shortness of breath, no orthopnea  Gastrointestinal: no n/v/d, no abdominal pain, no dysphagia   Genitourinary: no dysuria, no frequency, no nocturia, no hematuria  Musculoskeletal: no trauma, no sprain/strain, no myalgias, no arthralgias, no fracture  Neurological: no HA, no dizziness, no weakness, no numbness  Psychiatric: no depression, no SI/HI  Hematology/Lymphatics: no easy bruising  Endocrine: no heat or cold intolerance. no weight gain or loss  Allergic/Immunologic: no allergy or recent reaction REVIEW OF SYSTEMS:  General: no weakness, no fever/chills, no weight loss/gain  Skin/Breast: no rash, no jaundice  Ophthalmologic: no vision changes, no dry eyes   Respiratory and Thorax: no cough, no wheezing, no hemoptysis, no dyspnea  Cardiovascular: no chest pain, chest discomfort++, no shortness of breath, no orthopnea  Gastrointestinal: no n/v/d, no abdominal pain, no dysphagia   Genitourinary: no dysuria, no frequency, no nocturia, no hematuria  Musculoskeletal: no trauma, no sprain/strain, no myalgias, no arthralgias, no fracture  Neurological: no HA, no dizziness, no weakness, no numbness  Psychiatric: no depression, no SI/HI  Hematology/Lymphatics: no easy bruising  Endocrine: no heat or cold intolerance. no weight gain or loss  Allergic/Immunologic: no allergy or recent reaction

## 2020-01-31 NOTE — DISCHARGE NOTE PROVIDER - HOSPITAL COURSE
48 y/o M with PMHx of GERD, recent NST nl (1 year ago) who presents with chest pain over 1 week. Hst negative x 2. CXR negative. EKG in sinus rhythm No ischemic abnormalities. Cardiology consulted and patient for repeat NST.     Started Lipitor and ASA 81. Pt for discharge pending negative NST. 50 y/o M with PMHx of GERD, recent NST nl (1 year ago) who presents with chest pain over 1 week. Hst negative x 2. CXR negative. EKG in sinus rhythm No ischemic abnormalities. Cardiology consulted and patient for repeat NST. However, weight limit prevents patient from being able to have procedure.     Started Lipitor and ASA 81. Pt to be discharged with follow up with Dr. Lopez on Tuesday.

## 2020-01-31 NOTE — H&P ADULT - NSHPPHYSICALEXAM_GEN_ALL_CORE
GENERAL: NAD, AAOx3  HEAD:  Atraumatic, Normocephalic  EYES: EOMI, PERRLA, conjunctiva and sclera clear  NECK: Supple, No JVD, No LAD  CHEST/LUNG: Clear to auscultation bilaterally , No wheeze  HEART: Regular rate and rhythm; No murmurs, rubs, or gallops  ABDOMEN: Soft, Nontender, Nondistended; Bowel sounds present  EXTREMITIES:  2+Pulses, No clubbing, cyanosis, or edema  SKIN: No rashes or lesions

## 2020-01-31 NOTE — DISCHARGE NOTE PROVIDER - NSDCCPCAREPLAN_GEN_ALL_CORE_FT
PRINCIPAL DISCHARGE DIAGNOSIS  Diagnosis: Chest pain  Assessment and Plan of Treatment: HOME CARE INSTRUCTIONS  For the next few days, avoid physical activities that bring on chest pain. Continue physical activities as directed.  Do not smoke.  Avoid drinking alcohol.   Only take over-the-counter or prescription medicine for pain, discomfort, or fever as directed by your caregiver.  Follow your caregiver's suggestions for further testing if your chest pain does not go away. You were unable to have a nuclear stress test today. Your doctor may want to perform this or another test.   Keep any follow-up appointments you made. If you do not go to an appointment, you could develop lasting (chronic) problems with pain. If there is any problem keeping an appointment, you must call to reschedule.   SEEK MEDICAL CARE IF:  You think you are having problems from the medicine you are taking. Read your medicine instructions carefully.  Your chest pain does not go away, even after treatment.  You develop a rash with blisters on your chest.  SEEK IMMEDIATE MEDICAL CARE IF:  You have increased chest pain or pain that spreads to your arm, neck, jaw, back, or abdomen.   You develop shortness of breath, an increasing cough, or you are coughing up blood.  You have severe back or abdominal pain, feel nauseous, or vomit.  You develop severe weakness, fainting, or chills.  You have a fever.  THIS IS AN EMERGENCY. Do not wait to see if the pain will go away. Get medical help at once. Call your local emergency services by dialing 911. Do not drive yourself to the hospital.

## 2020-01-31 NOTE — CONSULT NOTE ADULT - SUBJECTIVE AND OBJECTIVE BOX
[facility] Cardiology Consult  _________________________    Patient is a 49y old  Male who presents with a chief complaint of chest pain (31 Jan 2020 02:45)      HPI:  49 yr old male with a pmhx of GERD presents with complaints of worsening chest discomfort for the past week. Patient notes its more often at night. Intermittent in nature, and describes it as a pulling feeling that radiates to the left arm and shoulder, mild-moderate, no clear palliative or provoking factors, occurs most often at night while at rest watching TV sitting upright. He describes the chest discomfort as  non exertional, non-pleuritic, non-positional, denies associated SOB, diaphoresis, palpitations, lightheadedness, n/v, or syncope. No prior cardiac hx. Patient describes getting yearly workup by his doctor with a normal stress test one year ago. Pt denies recent illness, falls/trauma,  abdominal or back pain, rashes, leg pain/edema. Patient called his PCP who could not see the patient in several days so he referred pt to the ED.   of note , patient was previously on dexilant but due to insurance changes has not had his reflux medications for some time. (31 Jan 2020 02:45)      PAST MEDICAL & SURGICAL HISTORY:  GERD (gastroesophageal reflux disease)  No significant past surgical history      MEDICATIONS  (STANDING):  sodium chloride 0.9%. 1000 milliLiter(s) (75 mL/Hr) IV Continuous <Continuous>    MEDICATIONS  (PRN):  acetaminophen   Tablet .. 650 milliGRAM(s) Oral every 4 hours PRN Mild Pain (1 - 3)  ondansetron Injectable 4 milliGRAM(s) IV Push every 6 hours PRN Nausea      Allergies    No Known Allergies    Intolerances        Social Histroy: Tobacco- , ETOH-, Illicit Drugs-    T(C): 36.4 (01-31-20 @ 05:34), Max: 36.6 (01-30-20 @ 18:50)  HR: 78 (01-31-20 @ 05:34) (78 - 88)  BP: 124/72 (01-31-20 @ 05:34) (124/72 - 137/81)  RR: 18 (01-31-20 @ 05:34) (18 - 18)  SpO2: 94% (01-31-20 @ 05:34) (94% - 96%)  I&O's Summary      Review of Systems:  Constitutional: [ ] Fever [ ] Chills [ ] Fatigue [ ] Weight change   HEENT: [ ] Blurred vision [ ] Eye Pain [ ] Headache [ ] Runny nose [ ] Sore Throat   Respiratory: [ ] Cough [ ] Wheezing [ ] Shortness of breath  Cardiovascular: [ x] Chest Pain [ ] Palpitations [ ] RAMOS [ ] PND [ ] Orthopnea  Gastrointestinal: [ ] Abdominal Pain [ ] Diarrhea [ ] Constipation [ ] Hemorrhoids [ ] Nausea [ ] Vomiting  Genitourinary: [ ] Nocturia [ ] Dysuria [ ] Incontinence  Extremities: [ ] Swelling [ ] Joint Pain  Neurologic: [ ] Focal deficit [ ] Paresthesias [ ] Syncope  Lymphatic: [ ] Swelling [ ] Lymphadenopathy   Skin: [ ] Rash [ ] Ecchymoses [ ] Wounds [ ] Lesions  Psychiatry: [ ] Depression [ ] Suicidal/Homicidal Ideation [ ] Anxiety [ ] Sleep Disturbances  [ ] 10 point review of systems is otherwise negative except as mentioned above            [ ]Unable to obtain    PHYSICAL EXAM:  GENERAL: Alert, NAD  NECK: Supple  CHEST/LUNG: Clear to auscultation bilaterally; No wheezes, rales, or rhonchi  HEART: S1 S2 normal, RRR,  No murmurs, rubs, or gallops  ABDOMEN: Soft, Nondistended  EXTREMITIES:  No LE edema.      LABS:                        15.0   10.11 )-----------( 321      ( 31 Jan 2020 08:56 )             46.5     01-31    143  |  105  |  15  ----------------------------<  106<H>  4.3   |  25  |  0.69    Ca    9.4      31 Jan 2020 08:56    TPro  7.8  /  Alb  4.2  /  TBili  0.5  /  DBili  x   /  AST  14  /  ALT  26  /  AlkPhos  55  01-31    PT/INR - ( 30 Jan 2020 21:43 )   PT: 11.9 sec;   INR: 1.03 ratio         PTT - ( 30 Jan 2020 21:43 )  PTT:33.2 sec              MEDICATIONS  (STANDING):  sodium chloride 0.9%. 1000 milliLiter(s) (75 mL/Hr) IV Continuous <Continuous>    MEDICATIONS  (PRN):  acetaminophen   Tablet .. 650 milliGRAM(s) Oral every 4 hours PRN Mild Pain (1 - 3)  ondansetron Injectable 4 milliGRAM(s) IV Push every 6 hours PRN Nausea      Troponin T, High Sensitivity Result: <6 ng/L (01-31-20 @ 08:56)  Troponin T, High Sensitivity Result: <6 ng/L (01-30-20 @ 21:43)      RADIOLOGY & ADDITIONAL TESTS:    Cardiology testing:  EKG: sinus, incomplete rbbb, 89 bpm      Telemetry: sinus

## 2020-01-31 NOTE — H&P ADULT - HISTORY OF PRESENT ILLNESS
50 y/o M PMHx of GERD presents c/o chest pain x 1.5 weeks. Pt notes he has been having intermittent chest pain described as a "stretching" or tightness in the L chest, radiates to the L arm and shoulder, mild-moderate, no clear palliative or provoking factors, occurs most often at night while at rest watching TV sitting upright. CP is non-exertional, non-pleuritic, non-positional, no recent gagging/retching, denies associated SOB, diaphoresis, palpitations, lightheadedness, n/v, or syncope. No Fhx. No prior cardiac hx. Last stress was 1 year ago- normal. Pt denies recent illness, falls/trauma, f/c, ripping/tearing pain, back/abdo pain, rashes, leg pain/edema. Pt d/w his PCP who could not see the pt in several days so referred pt to the ED 49 yr old male with a pmhx of GERD presents with complaints of worsening chest discomfort for the past week. Patient notes its more often at night. Intermittent in nature, and describes it as a pulling feeling that radiates to the left arm and shoulder, mild-moderate, no clear palliative or provoking factors, occurs most often at night while at rest watching TV sitting upright. He describes the chest discomfort as  non exertional, non-pleuritic, non-positional, denies associated SOB, diaphoresis, palpitations, lightheadedness, n/v, or syncope. No prior cardiac hx. Patient describes getting yearly workup by his doctor with a normal stress test one year ago. Pt denies recent illness, falls/trauma,  abdominal or back pain, rashes, leg pain/edema. Patient called his PCP who could not see the patient in several days so he referred pt to the ED.   of note , patient was previously on dexilant but due to insurance changes has not had his reflux medications for some time.

## 2020-01-31 NOTE — CONSULT NOTE ADULT - ASSESSMENT
49 yr old male with a pmhx of GERD presents with complaints of worsening chest discomfort for the past week

## 2020-01-31 NOTE — DISCHARGE NOTE PROVIDER - NSDCFUADDAPPT_GEN_ALL_CORE_FT
[facility]  [address]  [place] [address]  Phone: [phone]  Fax: [phone]                  Cassandra Campos   1/3/2017 2:00 PM   Office Visit    Description:  Female : 1948   Provider:  Emil Zhang MD   Department:  [facility]           Reason for Visit     Joint Swelling                To Do List           Future Appointments        Provider Department Dept Phone    1/10/2017 11:00 AM Ryanne Avila RD, [facility] Diabetes Management [phone]    1/10/2017 1:00 PM Rashaad Watson MD [facility] General Surgery [phone]    2017 1:45 PM Jayro Pedraza Sr., MD [facility] Orthopedics [phone]    2017 9:40 AM Denia Maldonado MD [facility] [phone]      Goals (5 Years of Data)     None       These Medications        Disp Refills Start End    hydrocodone-acetaminophen 7.5-325mg (NORCO) 7.5-325 mg per tablet 15 tablet 0 1/3/2017     Take 1 tablet by mouth daily as needed for Pain. - Oral    Pharmacy: [facility]/pharmacy #[id number] - [place], [place] - [address] AT AT [facility] Ph #: [phone]         [facility] On Call     [facility] On Call Nurse [facility] Line -  Assistance  Registered nurses in the [facility] provide clinical advisement, health education, appointment booking, and other advisory services.  Call for this free service at [phone].             Medications           Message regarding Medications     Verify the changes and/or additions to your medication regime listed below are the same as discussed with your clinician today.  If any of these changes or additions are incorrect, please notify your healthcare provider.        START taking these NEW medications        Refills    hydrocodone-acetaminophen 7.5-325mg (NORCO) 7.5-325 mg per tablet 0    Sig: Take 1 tablet by mouth daily as needed for Pain.    Class: Print    Route: Oral           Verify that the below list of medications is  "an accurate representation of the medications you are currently taking.  If none reported, the list may be blank. If incorrect, please contact your healthcare provider. Carry this list with you in case of emergency.           Current Medications     ACCU-CHEK FASTCLIX Misc USE ONE TIME DAILY    albuterol 90 mcg/actuation inhaler Inhale 2 puffs into the lungs every 6 (six) hours as needed for Wheezing.    amitriptyline (ELAVIL) 100 MG tablet TAKE 1 TABLET (100 MG TOTAL) BY MOUTH NIGHTLY.    aspirin (ECOTRIN) 81 MG EC tablet 81 mg once daily.     blood sugar diagnostic (ACCU-CHEK SMARTVIEW TEST STRIP) Strp Use to test once daily    blood-glucose meter kit Use as instructed    blood-glucose meter Misc 1 each by Misc.(Non-Drug; Combo Route) route once daily.    eszopiclone (LUNESTA) 3 mg Tab Take 1 tablet (3 mg total) by mouth nightly as needed.    FOLIC ACID/MULTIVIT-MIN/LUTEIN (CENTRUM SILVER ORAL) Take by mouth.    hydrocodone-acetaminophen 7.5-325mg (NORCO) 7.5-325 mg per tablet Take 1 tablet by mouth daily as needed for Pain.    metformin (GLUCOPHAGE-XR) 500 MG 24 hr tablet 4 tablets with meal daily    metoprolol succinate (TOPROL-XL) 50 MG 24 hr tablet Take 1 tablet by mouth once daily.    mupirocin (BACTROBAN) 2 % ointment     nabumetone (RELAFEN) 500 MG tablet Take 500 mg by mouth once daily.    omeprazole (PRILOSEC) 20 MG capsule Take 20 mg by mouth once daily.    pravastatin (PRAVACHOL) 40 MG tablet TAKE 1 TABLET ONE TIME DAILY           Clinical Reference Information           Vital Signs - Last Recorded  Most recent update: 1/3/2017  1:50 PM by Bhargavi Pittman LPN    BP Pulse Temp Ht Wt SpO2    115/70 92 98.1 °F (36.7 °C) (Oral) 5' 5" (1.651 m) 93.6 kg (206 lb 7.4 oz) 96%    BMI                34.36 kg/m2          Blood Pressure          Most Recent Value    BP  115/70      Allergies as of 1/3/2017     No Known Allergies      Immunizations Administered on Date of Encounter - 1/3/2017     None      " Follow up with Dr. Lopez within a few days of discharge.    Bring all discharge paperwork to your appointment.

## 2020-01-31 NOTE — DISCHARGE NOTE NURSING/CASE MANAGEMENT/SOCIAL WORK - PATIENT PORTAL LINK FT
You can access the FollowMyHealth Patient Portal offered by [facility] by registering at the following website: [email or web address]. By joining [facility]’s FollowMyHealth portal, you will also be able to view your health information using other applications (apps) compatible with our system.

## 2020-01-31 NOTE — H&P ADULT - NSHPLABSRESULTS_GEN_ALL_CORE
LABS:                        14.4   11.39 )-----------( 310      ( 30 Jan 2020 21:43 )             43.8     01-30    142  |  104  |  16  ----------------------------<  104<H>  4.3   |  25  |  0.80    Ca    9.1      30 Jan 2020 21:43    TPro  7.1  /  Alb  4.0  /  TBili  0.3  /  DBili  x   /  AST  14  /  ALT  29  /  AlkPhos  66  01-30    PT/INR - ( 30 Jan 2020 21:43 )   PT: 11.9 sec;   INR: 1.03 ratio         PTT - ( 30 Jan 2020 21:43 )  PTT:33.2 sec  CAPILLARY BLOOD GLUCOSE                RADIOLOGY & ADDITIONAL TESTS:    Imaging Personally Reviewed:  [x] YES  [ ] NO    Consultant(s) Notes Reviewed:  [x] YES  [ ] NO    Care Discussed with Consultants/Other Providers [x] YES  [ ] NO

## 2020-01-31 NOTE — DISCHARGE NOTE NURSING/CASE MANAGEMENT/SOCIAL WORK - NSDCFUADDAPPT_GEN_ALL_CORE_FT
Follow up with Dr. Lopez within a few days of discharge.    Bring all discharge paperwork to your appointment.

## 2020-01-31 NOTE — DISCHARGE NOTE PROVIDER - NSDCMRMEDTOKEN_GEN_ALL_CORE_FT
aspirin: 1 tab(s) orally , As Needed  Prevacid: 1 cap(s) orally once a day, As Needed acetaminophen 325 mg oral tablet: 2 tab(s) orally every 4 hours, As needed, Mild Pain (1 - 3)  aspirin 81 mg oral tablet, chewable: 1 tab(s) orally once a day  atorvastatin 40 mg oral tablet: 1 tab(s) orally once a day (at bedtime)  Prevacid: 1 cap(s) orally once a day, As Needed

## 2020-01-31 NOTE — CONSULT NOTE ADULT - PROBLEM SELECTOR RECOMMENDATION 9
-  -Atypical chest pain  -Nuclear stress test pending.  -if no significant ischemia then no further inpatient cardiac workup.   -aspirin 81 mg daily  -lipitor 40 mg daily.    Darvin Gomez D.O.  [phone]

## 2020-01-31 NOTE — DISCHARGE NOTE PROVIDER - CARE PROVIDER_API CALL
Alexander Lopez (MD)  Cardiovascular Disease; Internal Medicine  [address] [place] [address]  Phone: [phone]  Fax: [phone]  Established Patient  Follow Up Time: 1 week

## 2020-04-07 NOTE — ED PROVIDER NOTE - CROS ED SKIN NEG
Lab orders placed per Dr. Corrigan to be done suspected time post COVID.  Karol Whaley LPN  Nephrology  [phone]    
no rash

## 2020-06-05 NOTE — DISCHARGE NOTE NURSING/CASE MANAGEMENT/SOCIAL WORK - FLU SEASON?
"Stu Meredith is a 66 year old male who is being evaluated via a billable video visit.      The patient has been notified of following:     \"This video visit will be conducted via a call between you and your physician/provider. We have found that certain health care needs can be provided without the need for an in-person physical exam.  This service lets us provide the care you need with a video conversation.  If a prescription is necessary we can send it directly to your pharmacy.  If lab work is needed we can place an order for that and you can then stop by our lab to have the test done at a later time.    Video visits are billed at different rates depending on your insurance coverage.  Please reach out to your insurance provider with any questions.    If during the course of the call the physician/provider feels a video visit is not appropriate, you will not be charged for this service.\"    Patient has given verbal consent for Video visit? Yes    How would you like to obtain your AVS? Derian    Patient would like the video invitation sent by: Send to e-mail at: [email or web address]    Will anyone else be joining your video visit? No         HPI:  66 year old male with history of HCM without obstruction (dx 2006, EF 20% improved to 60%), VT s/p ICD 2012, nonobstructive CAD (cath 2013), AF s/p PVI X 3 (2011, 2016, 2018) and multiple DCCV and recent ablation presents for ongoing evaluation and management.  Today patient complains of ongoing sob and yanes.  He states that he has yanes at 100-300 feet.  He notes increase in yanes with bending and notes difficulty taking a deep breath.  He also notes abdominal distention/fullness and early satiety.  He denies any pnd or edema.  He notes only rare palpitations 3-4 brief episodes since ablation lasting less than 10 seconds.  Overall he reports he is feeling about the same since his ablation.  He denies any problems with his medications and reports " compliance.      Past Medical History:   Diagnosis Date     Atrial fibrillation (H) 12/9/11    failed medication, multiple DC cardioveresions; s/p Left atrial ablation to eliminate atrial fibrillation 12/9/11     Chest pain      CHF (congestive heart failure) (H) 7/30/2016     Coronary artery disease      DJD (degenerative joint disease)      Hip arthritis 1/15/2014     Hypertension      Hypertrophic cardiomyopathy (H) 10/09     Other abnormal heart sounds      Pacemaker     ICD     Palpitations      Pneumonia, organism unspecified(486)      Prediabetes 7/10/15    A1C 6.5     Status post implantation of automatic cardioverter/defibrillator (AICD)      Ventricular tachycardia (H)    - HCM diagnosed '06, previously burnt out (EF 20%) now with recovered EF  - h/o VT s/p dual chamber ICD '12  - AF s/p PVI X 2 ('11, '16, '18), h/o DCCV X 10    Meds:  acetaminophen (TYLENOL) 325 MG tablet, Take 325-650 mg by mouth every 6 hours as needed  albuterol (PROAIR HFA/PROVENTIL HFA/VENTOLIN HFA) 108 (90 Base) MCG/ACT inhaler, Inhale 2 puffs into the lungs every 6 hours  amoxicillin (AMOXIL) 500 MG tablet, Take 4 tablets (2000 mg) by mouth 1 hour before dental procedures.  atorvastatin (LIPITOR) 20 MG tablet, Take 1 tablet (20 mg) by mouth daily  cyanocobalamin (VITAMIN B-12) 100 MCG tablet, Take 100 mcg by mouth daily  dabigatran ANTICOAGULANT (PRADAXA ANTICOAGULANT) 150 MG capsule, Take 1 capsule (150 mg) by mouth 2 times daily Store in original 's bottle or blister pack; use within 120 days of opening.  dofetilide (TIKOSYN) 250 MCG capsule, Take 1 capsule (250 mcg) by mouth every 12 hours  eplerenone (INSPRA) 50 MG tablet, Take 1 tablet (50 mg) by mouth daily  furosemide (LASIX) 40 MG tablet, Take 1 tablet (40 mg) by mouth daily  gabapentin (NEURONTIN) 300 MG capsule, Take 2 capsules (600 mg) by mouth 2 times daily  metFORMIN (GLUCOPHAGE-XR) 500 MG 24 hr tablet, Take 2 tablets (1,000 mg) by mouth daily (with dinner)  Take 2 tablets (1,000 mg) daily (with dinner).  metoprolol succinate ER (TOPROL-XL) 50 MG 24 hr tablet, TAKE ONE TABLET BY MOUTH EVERY DAY  multivitamin, therapeutic (THERA-VIT) TABS, Take 1 tablet by mouth daily  sildenafil (VIAGRA) 100 MG tablet, Take 0.5-1 tablets ( mg) by mouth daily as needed (take 1 hour before intercourse)  sotalol (BETAPACE) 120 MG tablet,   zolpidem (AMBIEN) 10 MG tablet, Take 0.5 tablets (5 mg) by mouth nightly as needed for sleep Maximum number of tablets #30 in 90 days.  XARELTO ANTICOAGULANT 20 MG TABS tablet, Take 1 tablet (20 mg) by mouth daily (with dinner) (Patient not taking: Reported on 6/5/2020)    No current facility-administered medications on file prior to visit.   not taking sotalol      Social History     Socioeconomic History     Marital status:      Spouse name: Not on file     Number of children: Not on file     Years of education: Not on file     Highest education level: Not on file   Occupational History     Occupation:      Employer: [employer]   Social Needs     Financial resource strain: Not on file     Food insecurity:     Worry: Not on file     Inability: Not on file     Transportation needs:     Medical: Not on file     Non-medical: Not on file   Tobacco Use     Smoking status: Former Smoker     Packs/day: 1.00     Years: 40.00     Pack years: 40.00     Types: Cigarettes     Start date: 1/1/1975     Last attempt to quit: 12/11/2015     Years since quitting: 3.5     Smokeless tobacco: Never Used   Substance and Sexual Activity     Alcohol use: Yes     Alcohol/week: 0.0 oz     Comment: 1 drink per week     Drug use: No     Sexual activity: Yes     Partners: Female   Lifestyle     Physical activity:     Days per week: Not on file     Minutes per session: Not on file     Stress: Not on file   Relationships     Social connections:     Talks on phone: Not on file     Gets together: Not on file     Attends Jewish service: Not on file      Active member of club or organization: Not on file     Attends meetings of clubs or organizations: Not on file     Relationship status: Not on file     Intimate partner violence:     Fear of current or ex partner: Not on file     Emotionally abused: Not on file     Physically abused: Not on file     Forced sexual activity: Not on file   Other Topics Concern      Service Not Asked     Blood Transfusions Not Asked     Caffeine Concern Not Asked     Occupational Exposure Not Asked     Hobby Hazards Not Asked     Sleep Concern Not Asked     Stress Concern Not Asked     Weight Concern Not Asked     Special Diet Not Asked     Back Care Not Asked     Exercise No     Bike Helmet Not Asked     Seat Belt Yes     Self-Exams Not Asked     Parent/sibling w/ CABG, MI or angioplasty before 65F 55M? No   Social History Narrative     Not on file       Family History   Problem Relation Age of Onset     Heart Disease Mother         unknown     Obesity Mother      Gastrointestinal Disease Mother         diverticulitis     Diabetes Maternal Grandmother      Diabetes Paternal Grandmother      Cerebrovascular Disease Paternal Grandmother 94     Diabetes Paternal Grandfather      Cerebrovascular Disease Paternal Grandfather 78     Diabetes Son      C.A.D. Father         CABG age 78     Heart Disease Father         CABG x5     Diabetes Maternal Grandfather      LUNG DISEASE No family hx of      Deep Vein Thrombosis (DVT) No family hx of      Anesthesia Reaction No family hx of      Melanoma No family hx of      Skin Cancer No family hx of        ROS:   Constitutional: No fever, chills, or sweats.    ENT: No visual disturbance, ear ache, epistaxis, sore throat.   Allergies/Immunologic: Negative.   Respiratory: No cough, hemoptysis.   Cardiovascular: As per HPI.   GI: No nausea, vomiting, hematemesis, melena, or hematochezia.   : No urinary frequency, dysuria, or hematuria.   Integument: Negative.   Psychiatric: Negative.    Neuro: Negative.   Endocrinology: Negative.   Musculoskeletal: Chronic hip pain      There were no vitals taken for this visit.  There were no vitals taken for this visit. given virtual visit  Constitutional - WDWN, no acute distress   Eyes - no redness or discharge, nonicteric sclera  Respiratory - nonlabored breathing, able to speak in full sentences without difficulty  CV: no visible edema of visualized upper extremities.  Neurological - alert and oriented, speech fluent/appropriate  PSYCH: cooperative, affect appropriate  DERM: no rashes on visualized face/neck/upper extremities     The rest of a comprehensive physical examination is deferred due to PHE (public health emergency) video visit restrictions        CPX Echo 6/1/2018:  Interpretation Summary  Submaximal treadmill stress test in a patient with a diagnosis of HCM.  The Ramirez treadmill score is 8 (low risk).  Exercise was stopped due to fatigue.  Normal blood pressure response to exercise.  No ECG evidence of ischemia.  No supraventricular or ventricular arrhythmias. Frequent PVCs noted throughout the study.  Normal biventricular function with mild asymmetrical septal hypertrophy (12mm).  No dynamic outflow tract obstruction is present at rest or with exercise.  Normal segmental wall motion at rest and with exercise.  Minimal augmentation in LV function with exercise.  Average functional capacity for age.         CPX 2/15/2019:        CTA coronary angiogram 5/28/19  IMPRESSION:  1.  No evidence of coronary artery atherosclerosis or stenosis.  2.  Myocardial bridging involving the mid LAD.  3.  Total Agatston score 0 placing the patient in the lowest percentile when compared to age and gender matched control group.    Right heart cath 8/21/19   Time Systolic Diastolic Mean A Wave V Wave EDP Max dp/dt HR   RA Pressures  3:38 PM   12 mmHg    16 mmHg    14 mmHg      61 bpm      RV Pressures  3:38 PM 60 mmHg        16 mmHg     106 bpm      PA Pressures  3:41  PM 60 mmHg    22 mmHg    38 mmHg        69 bpm      PCW Pressures  3:39 PM   30 mmHg    28 mmHg    38 mmHg      74 bpm         Time Hb SAT(%) PO2 Content PA Sat   PA  3:28 PM  63.6 %      63.6 %      Art  3:28 PM  99 %     18.04 mL/dL       Cardiac Output Phase: Baseline      Time TDCO TDCI Tj C.O. Tj C.I. Tj HR   Cardiac Output Results  3:28 PM 3.8 L/min    1.79 L/min/m2    4.41 L/min    2.08 L/min/m2           Echo 12/20/2019  Global and regional left ventricular function is normal with an EF of 55-60%.  Global right ventricular function is normal.  IVC diameter <2.1 cm collapsing >50% with sniff suggests a normal RA pressure  of 3 mmHg.  No pericardial effusion is present.  Mild pulmonary hypertension is present.  No change from prior.     Echo 5/2020  Left ventricular systolic function is mildly reduced.  The visual ejection fraction is estimated at 45-50%.  Right ventricular systolic pressure is elevated, consistent with moderate  pulmonary hypertension.  The right ventricular systolic function is normal.  The right ventricle is normal size.  Compared to recent ALEJANDRA, EF again mildly reduced. RVSP increased compared to  prior TTE from 12/19.        Assessment/Plan:  66 year old male with history of HCM without obstruction (dx 2006, EF 20% improved to 60%), VT s/p ICD 2012, nonobstructive CAD (cath 2013), AF s/p PVI X 3 (2011, 2016, 2018) and multiple DCCV and recent ablation presents for ongoing evaluation and management.    # Hypertrophic cardiomyopathy with no evidence of LVOT obstruction.-- previously burnt out with EF 20% ('13) recovered (EF 60%) but last echo after recent ablation revealed reduction in LVEF 45-50%.  - patient with symptoms of volume overload.  Will increase lasix to 40mg qam and 20mg qpm.  Will f/u with patient next week to ensure improvement in symptoms      - continue metoprolol XL 50mg daily   - continue eplerenone 50mg daily  - pt aware of exercise restrictions and family screening  recommendations    # Nonobstructive CAD -- 10-30% stenoses on cath '13  - coronary CTA confirmed no significant disease  - continue atorvastatin 20     # HTN -- controlled  - continue metoprolol XL and eplerenone     # Atrial arrhythmias s/p recent ablation   - continue Xarelto  - continue dofetilide 250mcg  BID   - continue Metoprolol 50 XL daily   - followed by EP      Follow-up:  Obtain labs in 10-14 days.  Follow with me and Dr. Cooper in September 2020 with labs and device prior.    Mona Ware MD  Section Head - Advanced Heart Failure, Transplantation and Mechanical Circulatory Support  Director - Adult [facility]  Associate Professor of Medicine, [facility]        Video-Visit Details  Type of service:  Video Visit  Video Start Time: 13:01  Video End Time (time video stopped): 13:27  Originating Location (pt. Location): Home  Distant Location (provider location):  [facility]   Mode of Communication:  Video Conference via [employer] [place]   Yes...

## 2021-03-18 ENCOUNTER — INPATIENT (INPATIENT)
Facility: HOSPITAL | Age: 51
LOS: 0 days | Discharge: HOME | DRG: 287 | End: 2021-03-19
Attending: STUDENT IN AN ORGANIZED HEALTH CARE EDUCATION/TRAINING PROGRAM | Admitting: STUDENT IN AN ORGANIZED HEALTH CARE EDUCATION/TRAINING PROGRAM
Payer: COMMERCIAL

## 2021-03-18 VITALS
HEART RATE: 65 BPM | OXYGEN SATURATION: 98 % | HEIGHT: 69 IN | SYSTOLIC BLOOD PRESSURE: 123 MMHG | TEMPERATURE: 99 F | RESPIRATION RATE: 18 BRPM | DIASTOLIC BLOOD PRESSURE: 70 MMHG | WEIGHT: 315 LBS

## 2021-03-18 DIAGNOSIS — R07.9 CHEST PAIN, UNSPECIFIED: ICD-10-CM

## 2021-03-18 LAB
ALBUMIN SERPL ELPH-MCNC: 4.3 G/DL — SIGNIFICANT CHANGE UP (ref 3.3–5)
ALP SERPL-CCNC: 59 U/L — SIGNIFICANT CHANGE UP (ref 40–120)
ALT FLD-CCNC: 27 U/L — SIGNIFICANT CHANGE UP (ref 10–45)
ANION GAP SERPL CALC-SCNC: 10 MMOL/L — SIGNIFICANT CHANGE UP (ref 5–17)
APTT BLD: 32.8 SEC — SIGNIFICANT CHANGE UP (ref 27.5–35.5)
AST SERPL-CCNC: 18 U/L — SIGNIFICANT CHANGE UP (ref 10–40)
BASOPHILS # BLD AUTO: 0.04 K/UL — SIGNIFICANT CHANGE UP (ref 0–0.2)
BASOPHILS NFR BLD AUTO: 0.3 % — SIGNIFICANT CHANGE UP (ref 0–2)
BILIRUB SERPL-MCNC: 0.6 MG/DL — SIGNIFICANT CHANGE UP (ref 0.2–1.2)
BUN SERPL-MCNC: 14 MG/DL — SIGNIFICANT CHANGE UP (ref 7–23)
CALCIUM SERPL-MCNC: 9.8 MG/DL — SIGNIFICANT CHANGE UP (ref 8.4–10.5)
CHLORIDE SERPL-SCNC: 106 MMOL/L — SIGNIFICANT CHANGE UP (ref 96–108)
CO2 SERPL-SCNC: 26 MMOL/L — SIGNIFICANT CHANGE UP (ref 22–31)
CREAT SERPL-MCNC: 0.84 MG/DL — SIGNIFICANT CHANGE UP (ref 0.5–1.3)
EOSINOPHIL # BLD AUTO: 0.51 K/UL — HIGH (ref 0–0.5)
EOSINOPHIL NFR BLD AUTO: 4.2 % — SIGNIFICANT CHANGE UP (ref 0–6)
GLUCOSE SERPL-MCNC: 95 MG/DL — SIGNIFICANT CHANGE UP (ref 70–99)
HCT VFR BLD CALC: 46.2 % — SIGNIFICANT CHANGE UP (ref 39–50)
HGB BLD-MCNC: 15.1 G/DL — SIGNIFICANT CHANGE UP (ref 13–17)
IMM GRANULOCYTES NFR BLD AUTO: 0.3 % — SIGNIFICANT CHANGE UP (ref 0–1.5)
INR BLD: 1.11 RATIO — SIGNIFICANT CHANGE UP (ref 0.88–1.16)
LIDOCAIN IGE QN: 26 U/L — SIGNIFICANT CHANGE UP (ref 7–60)
LYMPHOCYTES # BLD AUTO: 1.97 K/UL — SIGNIFICANT CHANGE UP (ref 1–3.3)
LYMPHOCYTES # BLD AUTO: 16.1 % — SIGNIFICANT CHANGE UP (ref 13–44)
MAGNESIUM SERPL-MCNC: 2.2 MG/DL — SIGNIFICANT CHANGE UP (ref 1.6–2.6)
MCHC RBC-ENTMCNC: 28.6 PG — SIGNIFICANT CHANGE UP (ref 27–34)
MCHC RBC-ENTMCNC: 32.7 GM/DL — SIGNIFICANT CHANGE UP (ref 32–36)
MCV RBC AUTO: 87.5 FL — SIGNIFICANT CHANGE UP (ref 80–100)
MONOCYTES # BLD AUTO: 0.89 K/UL — SIGNIFICANT CHANGE UP (ref 0–0.9)
MONOCYTES NFR BLD AUTO: 7.3 % — SIGNIFICANT CHANGE UP (ref 2–14)
NEUTROPHILS # BLD AUTO: 8.76 K/UL — HIGH (ref 1.8–7.4)
NEUTROPHILS NFR BLD AUTO: 71.8 % — SIGNIFICANT CHANGE UP (ref 43–77)
NRBC # BLD: 0 /100 WBCS — SIGNIFICANT CHANGE UP (ref 0–0)
NT-PROBNP SERPL-SCNC: 144 PG/ML — SIGNIFICANT CHANGE UP (ref 0–300)
PHOSPHATE SERPL-MCNC: 3 MG/DL — SIGNIFICANT CHANGE UP (ref 2.5–4.5)
PLATELET # BLD AUTO: 280 K/UL — SIGNIFICANT CHANGE UP (ref 150–400)
POTASSIUM SERPL-MCNC: 4.3 MMOL/L — SIGNIFICANT CHANGE UP (ref 3.5–5.3)
POTASSIUM SERPL-SCNC: 4.3 MMOL/L — SIGNIFICANT CHANGE UP (ref 3.5–5.3)
PROT SERPL-MCNC: 7.6 G/DL — SIGNIFICANT CHANGE UP (ref 6–8.3)
PROTHROM AB SERPL-ACNC: 13.3 SEC — SIGNIFICANT CHANGE UP (ref 10.6–13.6)
RBC # BLD: 5.28 M/UL — SIGNIFICANT CHANGE UP (ref 4.2–5.8)
RBC # FLD: 13.2 % — SIGNIFICANT CHANGE UP (ref 10.3–14.5)
SODIUM SERPL-SCNC: 142 MMOL/L — SIGNIFICANT CHANGE UP (ref 135–145)
TROPONIN T, HIGH SENSITIVITY RESULT: <6 NG/L — SIGNIFICANT CHANGE UP (ref 0–51)
TROPONIN T, HIGH SENSITIVITY RESULT: <6 NG/L — SIGNIFICANT CHANGE UP (ref 0–51)
WBC # BLD: 12.21 K/UL — HIGH (ref 3.8–10.5)
WBC # FLD AUTO: 12.21 K/UL — HIGH (ref 3.8–10.5)

## 2021-03-18 PROCEDURE — 99285 EMERGENCY DEPT VISIT HI MDM: CPT

## 2021-03-18 PROCEDURE — 71045 X-RAY EXAM CHEST 1 VIEW: CPT | Mod: 26

## 2021-03-18 PROCEDURE — 93010 ELECTROCARDIOGRAM REPORT: CPT | Mod: NC

## 2021-03-18 RX ORDER — ASPIRIN/CALCIUM CARB/MAGNESIUM 324 MG
324 TABLET ORAL DAILY
Refills: 0 | Status: DISCONTINUED | OUTPATIENT
Start: 2021-03-18 | End: 2021-03-19

## 2021-03-18 RX ADMIN — Medication 324 MILLIGRAM(S): at 17:33

## 2021-03-18 NOTE — ED ADULT NURSE NOTE - OBJECTIVE STATEMENT
Pt is a 50 y/o male c/o left sided chest discomfort with slight radiation to left shoulder. States it has been going on x2 wks, saw primary today who sent pt in stating his EKG was different from past EKG. States "feeling is difficult to describe, I just feel different". Denies exertion making symptoms worse. Denies SOB, N/V/D, numbness, tingling, cough, fever, chills, dizziness, weakness, headache. A&Ox3. Breathing unlabored and spontaneous. Abdomen soft, nontender, nondistended. Full ROM and strength of extremities. Safety and comfort measures provided, call bell provided to pt and bed in lowest position.

## 2021-03-18 NOTE — ED PROVIDER NOTE - OBJECTIVE STATEMENT
50y/o M morbidly obese (350lbs) w/ h/o GERD p/w chest discomfort for 2wks, L side non radiating unsure of exacerbating or factors, rubbing chest improves. Went to primary doc today had different EKG and sent to ED. Last stress test and echo was 1 year ago. Endorses SOB when ambulating upstairs. Denies n/v, diaphoresis, family history of heart disease.

## 2021-03-18 NOTE — ED PROVIDER NOTE - CLINICAL SUMMARY MEDICAL DECISION MAKING FREE TEXT BOX
50y/o M morbidly obese (350lbs) w/ h/o GERD p/w chest discomfort with EKG showing bigeminy. Will check serial EKGs and troponin, CXR and admission.

## 2021-03-18 NOTE — ED PROVIDER NOTE - ATTENDING CONTRIBUTION TO CARE
Attending MD Robles: I personally have seen and examined this patient.  Resident note reviewed and agree on plan of care and except where noted.  See below for details.     Seen in [place] [facility]    51M with PMH/PSH including GERD on Dexlansoprazole, Famotidine presents to the ED sent in by his PMD Dr. Alexander Lopez ([facility]) for chest pain.  Reports that for two weeks has been having intermittent chest pain with occasional dyspnea on exertion.  Reports chest pain, feels like tightness, denies radiation, reports pain is mid sternal and lateral (L) of this area and has found himself rubbing the area.  Reports had a stress and echo a year ago.  Reports has noted LE edema, reports was sent to a "foot and ankle specialist" who recommended he see his PMD for possible "water pill".  Reports did see his PMD and saw "change" in EKG and sent him into the ED for evaluation.  Reports feels different than his usual GERD.  Denies abdominal pain, nausea, vomiting, diarrhea, blood in stools. Denies dysuria, hematuria, change in urinary habits including frequency, urgency. Denies change in vision, double vision, sudden loss of vision. Denies numbness, weakness or tingling in extremities.  Reports former cigar smoker.  Reports FHx AFib.  A ten (10) point review of systems was negative other than as stated in the HPI or elsewhere in the chart.    Exam:   General: NAD, wt 174.6kg  HENT: head NCAT, airway patent   Eyes: PERRL  Lungs: lungs CTAB with good inspiratory effort, no wheezing, no rhonchi, no rales  Cardiac: +S1S2, no m/r/g, +pitting edema to bilateral LE to knees  GI: abdomen soft with +BS, NT, exam limited secondary to body habitus  : no CVAT  MSK: FROM at neck, no tenderness to midline palpation  Neuro: moving all extremities with 5/5 strength bilateral upper and lower extremities, sensory grossly intact, no gross neuro deficits  Psych: normal mood and affect    A/P: 51M with chest pain and dyspnea on exertion, EKG with bigeminy, differential includes unstable angina, will proceed with ACS work up, will obtain labs including trop, dimer, BNP, EKG, CXR, COVID swab, cardiac monitor, admit

## 2021-03-18 NOTE — ED PROVIDER NOTE - RAPID ASSESSMENT
51y M with PMHx of GERD presents to the ED c/o chest discomfort x 2 weeks. Unsure if he has SOB. Admits rubbing his chest makes the pain feel better. States that his sx feel different from his reflux symptoms. Saw his Orthopedics doctor today, had abnormal EKG, was told to come into ED for further evaluation.     Sonam RUIZ (Scriberma) have documented this rapid assessment note under the dictation of Anabell FONG) which has been reviewed and affirmed to be accurate. Patient was seen as a QPA patient. The patient will be seen and further worked up in the main emergency department and their care will be completed by the main emergency department team along with a thorough physical exam. Receiving team will follow up on labs, analgesia, any clinical imaging, reassess and disposition as clinically indicated, all decisions regarding the progression of care will be made at their discretion. 51y M with PMHx of GERD presents to the ED c/o chest discomfort x 2 weeks. Unsure if he has SOB. Admits rubbing his chest makes the pain feel better. States that his sx feel different from his reflux symptoms. Saw his PCP today, had abnormal EKG, was told to come into ED for further evaluation.     Sonam RUIZ (Scribe) have documented this rapid assessment note under the dictation of Anabell FONG) which has been reviewed and affirmed to be accurate. Patient was seen as a QPA patient. The patient will be seen and further worked up in the main emergency department and their care will be completed by the main emergency department team along with a thorough physical exam. Receiving team will follow up on labs, analgesia, any clinical imaging, reassess and disposition as clinically indicated, all decisions regarding the progression of care will be made at their discretion. 51y M with PMHx of GERD presents to the ED c/o chest discomfort x 2 weeks. Unsure if he has SOB. Admits rubbing his chest makes the pain feel better. States that his sx feel different from his reflux symptoms. Saw his PCP today, had abnormal EKG, was told to come into ED for further evaluation.     ISonam (Josep) have documented this rapid assessment note under the dictation of Anabell Thompson (PA) which has been reviewed and affirmed to be accurate. Patient was seen as a QPA patient. The patient will be seen and further worked up in the main emergency department and their care will be completed by the main emergency department team along with a thorough physical exam. Receiving team will follow up on labs, analgesia, any clinical imaging, reassess and disposition as clinically indicated, all decisions regarding the progression of care will be made at their discretion.    Rapid assessment by Anabell Thompson PA-C full eval to be performed in ED. Above documentation completed by scribe above. I was present for and agree with documentation.   Anabell Thompson PA-C

## 2021-03-18 NOTE — ED PROVIDER NOTE - CARDIAC, MLM
Normal rate, regular rhythm.  Heart sounds S1, S2.  difficult to assess murmurs, rubs or gallops due to large body habitus

## 2021-03-19 ENCOUNTER — TRANSCRIPTION ENCOUNTER (OUTPATIENT)
Age: 51
End: 2021-03-19

## 2021-03-19 VITALS
HEART RATE: 96 BPM | OXYGEN SATURATION: 96 % | SYSTOLIC BLOOD PRESSURE: 130 MMHG | RESPIRATION RATE: 18 BRPM | DIASTOLIC BLOOD PRESSURE: 80 MMHG

## 2021-03-19 DIAGNOSIS — E66.01 MORBID (SEVERE) OBESITY DUE TO EXCESS CALORIES: ICD-10-CM

## 2021-03-19 DIAGNOSIS — R07.9 CHEST PAIN, UNSPECIFIED: ICD-10-CM

## 2021-03-19 DIAGNOSIS — K21.9 GASTRO-ESOPHAGEAL REFLUX DISEASE WITHOUT ESOPHAGITIS: ICD-10-CM

## 2021-03-19 LAB
BASOPHILS # BLD AUTO: 0.04 K/UL — SIGNIFICANT CHANGE UP (ref 0–0.2)
BASOPHILS NFR BLD AUTO: 0.3 % — SIGNIFICANT CHANGE UP (ref 0–2)
EOSINOPHIL # BLD AUTO: 0.44 K/UL — SIGNIFICANT CHANGE UP (ref 0–0.5)
EOSINOPHIL NFR BLD AUTO: 3.8 % — SIGNIFICANT CHANGE UP (ref 0–6)
HCT VFR BLD CALC: 45 % — SIGNIFICANT CHANGE UP (ref 39–50)
HGB BLD-MCNC: 14.6 G/DL — SIGNIFICANT CHANGE UP (ref 13–17)
IMM GRANULOCYTES NFR BLD AUTO: 0.4 % — SIGNIFICANT CHANGE UP (ref 0–1.5)
LYMPHOCYTES # BLD AUTO: 1.8 K/UL — SIGNIFICANT CHANGE UP (ref 1–3.3)
LYMPHOCYTES # BLD AUTO: 15.4 % — SIGNIFICANT CHANGE UP (ref 13–44)
MCHC RBC-ENTMCNC: 28.7 PG — SIGNIFICANT CHANGE UP (ref 27–34)
MCHC RBC-ENTMCNC: 32.4 GM/DL — SIGNIFICANT CHANGE UP (ref 32–36)
MCV RBC AUTO: 88.4 FL — SIGNIFICANT CHANGE UP (ref 80–100)
MONOCYTES # BLD AUTO: 1.08 K/UL — HIGH (ref 0–0.9)
MONOCYTES NFR BLD AUTO: 9.2 % — SIGNIFICANT CHANGE UP (ref 2–14)
NEUTROPHILS # BLD AUTO: 8.27 K/UL — HIGH (ref 1.8–7.4)
NEUTROPHILS NFR BLD AUTO: 70.9 % — SIGNIFICANT CHANGE UP (ref 43–77)
NRBC # BLD: 0 /100 WBCS — SIGNIFICANT CHANGE UP (ref 0–0)
PLATELET # BLD AUTO: 273 K/UL — SIGNIFICANT CHANGE UP (ref 150–400)
RBC # BLD: 5.09 M/UL — SIGNIFICANT CHANGE UP (ref 4.2–5.8)
RBC # FLD: 13.4 % — SIGNIFICANT CHANGE UP (ref 10.3–14.5)
SARS-COV-2 RNA SPEC QL NAA+PROBE: SIGNIFICANT CHANGE UP
WBC # BLD: 11.68 K/UL — HIGH (ref 3.8–10.5)
WBC # FLD AUTO: 11.68 K/UL — HIGH (ref 3.8–10.5)

## 2021-03-19 PROCEDURE — 93306 TTE W/DOPPLER COMPLETE: CPT | Mod: 26

## 2021-03-19 PROCEDURE — 99152 MOD SED SAME PHYS/QHP 5/>YRS: CPT | Mod: 59

## 2021-03-19 PROCEDURE — 93454 CORONARY ARTERY ANGIO S&I: CPT | Mod: 26

## 2021-03-19 RX ORDER — DEXLANSOPRAZOLE 30 MG/1
1 CAPSULE, DELAYED RELEASE ORAL
Qty: 0 | Refills: 0 | DISCHARGE

## 2021-03-19 RX ORDER — ATORVASTATIN CALCIUM 80 MG/1
1 TABLET, FILM COATED ORAL
Qty: 120 | Refills: 3
Start: 2021-03-19 | End: 2022-07-11

## 2021-03-19 RX ORDER — ASPIRIN/CALCIUM CARB/MAGNESIUM 324 MG
1 TABLET ORAL
Qty: 120 | Refills: 3
Start: 2021-03-19 | End: 2022-07-12

## 2021-03-19 RX ORDER — TIZANIDINE 4 MG/1
1 TABLET ORAL
Qty: 0 | Refills: 0 | DISCHARGE

## 2021-03-19 RX ORDER — FAMOTIDINE 10 MG/ML
1 INJECTION INTRAVENOUS
Qty: 0 | Refills: 0 | DISCHARGE

## 2021-03-19 RX ORDER — ONDANSETRON 8 MG/1
4 TABLET, FILM COATED ORAL EVERY 6 HOURS
Refills: 0 | Status: DISCONTINUED | OUTPATIENT
Start: 2021-03-19 | End: 2021-03-19

## 2021-03-19 RX ORDER — ASPIRIN/CALCIUM CARB/MAGNESIUM 324 MG
1 TABLET ORAL
Qty: 120 | Refills: 3
Start: 2021-03-19 | End: 2022-07-11

## 2021-03-19 RX ORDER — PANTOPRAZOLE SODIUM 20 MG/1
40 TABLET, DELAYED RELEASE ORAL
Refills: 0 | Status: DISCONTINUED | OUTPATIENT
Start: 2021-03-19 | End: 2021-03-19

## 2021-03-19 RX ORDER — ATORVASTATIN CALCIUM 80 MG/1
40 TABLET, FILM COATED ORAL AT BEDTIME
Refills: 0 | Status: DISCONTINUED | OUTPATIENT
Start: 2021-03-19 | End: 2021-03-19

## 2021-03-19 RX ORDER — LANSOPRAZOLE 15 MG/1
1 CAPSULE, DELAYED RELEASE ORAL
Qty: 0 | Refills: 0 | DISCHARGE

## 2021-03-19 RX ORDER — ACETAMINOPHEN 500 MG
650 TABLET ORAL EVERY 6 HOURS
Refills: 0 | Status: DISCONTINUED | OUTPATIENT
Start: 2021-03-19 | End: 2021-03-19

## 2021-03-19 RX ORDER — ENOXAPARIN SODIUM 100 MG/ML
40 INJECTION SUBCUTANEOUS DAILY
Refills: 0 | Status: DISCONTINUED | OUTPATIENT
Start: 2021-03-19 | End: 2021-03-19

## 2021-03-19 RX ORDER — ACETAMINOPHEN 500 MG
650 TABLET ORAL EVERY 4 HOURS
Refills: 0 | Status: DISCONTINUED | OUTPATIENT
Start: 2021-03-19 | End: 2021-03-19

## 2021-03-19 RX ORDER — MELOXICAM 15 MG/1
1 TABLET ORAL
Qty: 0 | Refills: 0 | DISCHARGE

## 2021-03-19 RX ORDER — ASPIRIN/CALCIUM CARB/MAGNESIUM 324 MG
81 TABLET ORAL DAILY
Refills: 0 | Status: DISCONTINUED | OUTPATIENT
Start: 2021-03-20 | End: 2021-03-19

## 2021-03-19 RX ORDER — ATORVASTATIN CALCIUM 80 MG/1
1 TABLET, FILM COATED ORAL
Qty: 120 | Refills: 3
Start: 2021-03-19 | End: 2022-07-12

## 2021-03-19 RX ADMIN — PANTOPRAZOLE SODIUM 40 MILLIGRAM(S): 20 TABLET, DELAYED RELEASE ORAL at 05:58

## 2021-03-19 NOTE — H&P ADULT - ASSESSMENT
51 yr old morbidly obese male with a hx of GERD presents with 3 weeks of chest discomfort on the left side monitoring for cardiac events.

## 2021-03-19 NOTE — H&P ADULT - HISTORY OF PRESENT ILLNESS
51 yr old morbidly obese male with a hx of GERD presents with 3 weeks of chest discomfort on the left side non radiating without any exacerbating factors. He states rubbing his chest makes it feel better. He went to his primary care doctor and was found to have changes in his EKG. He was advised to go to the emergency room for further workup.  Last stress test and echo was 1 year ago. Patient does state he has had some increasing shortness of breath with ambulation.  Denies n/v, diaphoresis, family history of heart disease.

## 2021-03-19 NOTE — CONSULT NOTE ADULT - ASSESSMENT
51 yr old morbidly obese male with a hx of GERD presents with 3 weeks of chest discomfort, sent by pcp for abnormal ecg.

## 2021-03-19 NOTE — DISCHARGE NOTE PROVIDER - CARE PROVIDER_API CALL
Alexander Lopez (MD)  Cardiovascular Disease; Internal Medicine  [address] [facility] [place] [address]  Phone: [phone]  Fax: [phone]  Established Patient  Follow Up Time: 2 weeks

## 2021-03-19 NOTE — DISCHARGE NOTE PROVIDER - HOSPITAL COURSE
51 yr old morbidly obese male with a hx of GERD presents with 3 weeks of chest discomfort on the left side non radiating without any exacerbating factors. He states rubbing his chest makes it feel better. He went to his primary care doctor and was found to have changes in his EKG. He was advised to go to the emergency room for further workup.  Last stress test and echo was 1 year ago. Patient does state he has had some increasing shortness of breath with ambulation.  Denies n/v, diaphoresis, family history of heart disease.  He went for LHC which just showed mild luminal irregularities of the LAD.  He tolerated the procedure well and is now stable to return home with plan for outpatient follow up. 51 yr old morbidly obese male with a hx of GERD presents with 3 weeks of chest discomfort on the left side non radiating without any exacerbating factors. He states rubbing his chest makes it feel better. He went to his primary care doctor and was found to have changes in his EKG. He was advised to go to the emergency room for further workup.  Last stress test and echo was 1 year ago. Patient does state he has had some increasing shortness of breath with ambulation.  Denies n/v, diaphoresis, family history of heart disease.  He went for LHC which just showed mild luminal irregularities of the LAD.  He tolerated the procedure well with no complications.  He also had an echo performed which shows ____________.  He is now stable to return home with plan for outpatient follow up.

## 2021-03-19 NOTE — DISCHARGE NOTE PROVIDER - NSDCMRMEDTOKEN_GEN_ALL_CORE_FT
Dexilant 60 mg oral delayed release capsule: 1 cap(s) orally once a day  Mobic 15 mg oral tablet: 1 tab(s) orally once a day, As Needed  Pepcid 20 mg oral tablet: 1 tab(s) orally once a day  tiZANidine 4 mg oral tablet: 1 tab(s) orally once a day, As Needed   aspirin 81 mg oral tablet, chewable: 1 tab(s) orally once a day  Dexilant 60 mg oral delayed release capsule: 1 cap(s) orally once a day  Lipitor 40 mg oral tablet: 1 tab(s) orally once a day (at bedtime)  Mobic 15 mg oral tablet: 1 tab(s) orally once a day, As Needed  Pepcid 20 mg oral tablet: 1 tab(s) orally once a day  tiZANidine 4 mg oral tablet: 1 tab(s) orally once a day, As Needed

## 2021-03-19 NOTE — DISCHARGE NOTE PROVIDER - NSDCCPGOAL_GEN_ALL_CORE_FT
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 Baby is gaining appropriate weight 25 grams/day with high-calorie formula, but will continue to monitor weight gain closely.\par \par PLAN\par CARDIOLOGY: VSD, mildly dilated aortic root, aberrant right subclavian\par - Monitor for symptoms including tachypnea, sweating, irritability with feeds. \par - Follow-up at six months of age for follow-up evaluation. Will need to be watched closely to ensure that there is no aortic valve prolapse into the defect.\par \par ENDOCRINE: DiGeorge, r/o hypoparathyroidism\par - Calcium levels reassuring and will not need further follow-up with Endocrinology at this time. \par \par ALLERGY/IMMUNOLOGY: DiGeorge Syndrome\par - Needs repeat lab work including BMP and full T cell subsets. \par - Recommend referral to ENT for anatomic defects. \par - Will avoid live vaccines (rotavirus) at this time. \par - Will follow-up with Allergy/Immunology. \par \par GENETICS \par - Will follow-up with mother's lab results. \par \par NEONATOLOGY\par  - Missed high risk Neonatology appointment, and will need to reschedule. Number given. \par \par ORTHOPEDICS - Bilateral club feet\par - Appointment rescheduled to 1/23/19.\par \par OPHTHALMOLOGY - r/o coloboma\par - Will need to follow-up as soon as possible, and needs appointment made. Number given. \par \par ENT\par - Will need to schedule an appointment. Would benefit from having scope done to rule out laryngomalacia or any conditions given the mild suprasternal retractions. No immediate concerns at this time. Number given. \par \par DEVELOPMENTAL PEDIATRICS\par - Continue with Early Intervention and receive services including PT. \par - Follow-up with Developmental Pediatrics in April 2019. \par \par HYPERKALEMIA\par - Recommended that mother have BMP repeated as soon as possible. \par \par GASSINESS\par - Prescribed simethicone. \par \par Follow-up within one month for 4 month well check visit.  To get better and follow your care plan as instructed.

## 2021-03-19 NOTE — H&P ADULT - NSHPREVIEWOFSYSTEMS_GEN_ALL_CORE
REVIEW OF SYSTEMS:  General: no weakness, no fever/chills, no weight loss/gain  Skin/Breast: no rash, no jaundice  Ophthalmologic: no vision changes, no dry eyes   Respiratory and Thorax: no cough, no wheezing, no hemoptysis, no dyspnea  Cardiovascular: no chest pain, no shortness of breath, no orthopnea  Gastrointestinal: no n/v/d, no abdominal pain, no dysphagia   Genitourinary: no dysuria, no frequency, no nocturia, no hematuria  Musculoskeletal: no trauma, no sprain/strain, no myalgias, no arthralgias, no fracture  Neurological: no HA, no dizziness, no weakness, no numbness

## 2021-03-19 NOTE — DISCHARGE NOTE PROVIDER - NSDCFUADDINST_GEN_ALL_CORE_FT
Wound Care:   the day AFTER your procedure remove bandage GENTTLY, and clean using  mild soap and gentle warm, water stream, pat dry. leave OPEN to air. YOU MAY SHOWER   DO NOT apply lotions, creams, ointments, powder, parfumes to your incision site  DO NOT SOAK your site for 1 week ( no baths, no pools, no tubs, etc...)  Check  your groin and /or wirst daily.  A small amount of bruising and soarness are normal    ACTIVITY: for 24 hours   - DO NOT DRIVE  - DO NOT make any important decisions or sign legal documents   - DO NOT operate heavy machinaries   - you may resume sexual activity in 48 hours, unless otherwise instructed by your cardiologist     If your procedure was done through the WRIST: for the NEXT 3DAYS:  - avoid pushing, pulling, with that affected wrist   - avoid repeated movement of that hand and wrist ( eg: typing, hammering)  - DO NOT LIFT anything more than 5 lbs     If your procedure was done through the GROIN: for the NEXT 5 DAYS  - Limit climbing stairs, DO NOT soak in bathtub or pool  - no strenous activities, pushing, pulling, straining  - Do not lift anything 10lbs or heavier     MEDICATION:   take your medications as explained ( see discharge paperwork)   If you received a STENT, you will be taking antiplatelet medications to KEEP YOUR STENT OPEN ( eg: Aspirin, Plavix, Brilinta, Effient, etc).  Take as prescribed DO NOT STOP taking them without consulting with your cardiologist first.     Follow heart healthy diet reccomended by your doctor, , if you smoke STOP SMOKING ( may call [phone] for center of tobacco control if you need assistance)     CALL your doctor to make appointment in 2 WEEKS     ***CALL YOUR DOCTOR***  if you experience: fever, chills, body aches, or severe pain, swelling, redness, heat or yellow discharge at incision site  If you experience Bleeding or excruciating pain at the procedural site, sweliing ( golf ball size) at your procedural site  If you experience CHEST PAIN  If you experience extremity numbness, tingling, temperature change ( of your procedural site)   If you are unable to reach your doctor, you may contact:   -Cardiology Office at [facility] at [phone] or   - [facility] [phone]  - [facility] [phone]

## 2021-03-19 NOTE — H&P ADULT - ATTENDING COMMENTS
51 yr old morbidly obese male with a hx of GERD presents with 3 weeks of chest discomfort. ACS ruled out.  tele TTE cardiac cath   appreciate cardio recs  no active CP  asa statin

## 2021-03-19 NOTE — H&P ADULT - NSHPPHYSICALEXAM_GEN_ALL_CORE
GENERAL: NAD, AAOx3  HEAD:  Atraumatic, Normocephalic  EYES: EOMI, , conjunctiva and sclera clear  NECK: Supple, No JVD, No LAD  CHEST/LUNG: Clear bilateral, No wheeze  HEART: Regular rate and rhythm; No murmurs, rubs, or gallops  ABDOMEN: Soft, Nontender, Nondistended; Bowel sounds present  EXTREMITIES:  + Pulses, No clubbing, cyanosis, or edema  SKIN: No rashes or lesions

## 2021-03-19 NOTE — H&P ADULT - NSHPLABSRESULTS_GEN_ALL_CORE
LABS:                        15.1   12.21 )-----------( 280      ( 18 Mar 2021 17:47 )             46.2     03-18    142  |  106  |  14  ----------------------------<  95  4.3   |  26  |  0.84    Ca    9.8      18 Mar 2021 17:47  Phos  3.0     03-18  Mg     2.2     03-18    TPro  7.6  /  Alb  4.3  /  TBili  0.6  /  DBili  x   /  AST  18  /  ALT  27  /  AlkPhos  59  03-18    PT/INR - ( 18 Mar 2021 17:47 )   PT: 13.3 sec;   INR: 1.11 ratio         PTT - ( 18 Mar 2021 17:47 )  PTT:32.8 sec  CAPILLARY BLOOD GLUCOSE                RADIOLOGY & ADDITIONAL TESTS:    Imaging Personally Reviewed:  [x] YES  [ ] NO    Consultant(s) Notes Reviewed:  [x] YES  [ ] NO    Care Discussed with Consultants/Other Providers [x] YES  [ ] NO

## 2021-03-19 NOTE — DISCHARGE NOTE NURSING/CASE MANAGEMENT/SOCIAL WORK - PATIENT PORTAL LINK FT
You can access the FollowMyHealth Patient Portal offered by [facility] by registering at the following website: [email or web address]. By joining [employer]’s FollowMyHealth portal, you will also be able to view your health information using other applications (apps) compatible with our system.

## 2021-03-19 NOTE — DISCHARGE NOTE PROVIDER - NSDCCPCAREPLAN_GEN_ALL_CORE_FT
PRINCIPAL DISCHARGE DIAGNOSIS  Diagnosis: Chest pain, unspecified type  Assessment and Plan of Treatment:

## 2021-03-19 NOTE — CONSULT NOTE ADULT - PROBLEM SELECTOR RECOMMENDATION 9
-  -atypical chest pain (rule out significant cad vs noncardiac- msk related pain/gerd)  -trop negative x2  -nuclear stress test- 2 day protocol given bmi  -check tte  -ECG/tele- sinus rhythm with frequent pvcs.  -PVCs are new from baseline ecgs.  -if stress and echo unremarkable then recommend outpatient sleep study to rule out EDVIN.     Patient of Dr. Alexander Lopez ([facility])    SREE Ritter.O.  [phone] -  -atypical chest pain (rule out significant cad vs noncardiac- msk related pain/gerd)  -trop negative x2  -Cardiac cath today  -check tte  -ECG/tele- sinus rhythm with frequent pvcs.  -PVCs are new from baseline ecgs.  -if stress and echo unremarkable then recommend outpatient sleep study to rule out EDVIN.     Patient of Dr. Alexander Lopez ([facility])    Darvin Gomez D.O.  [phone]

## 2021-03-19 NOTE — DISCHARGE NOTE PROVIDER - NSDCCPTREATMENT_GEN_ALL_CORE_FT
PRINCIPAL PROCEDURE  Procedure: Angiogram, artery, coronary, with left heart catheterization  Findings and Treatment:

## 2021-03-19 NOTE — CONSULT NOTE ADULT - SUBJECTIVE AND OBJECTIVE BOX
[facility] Cardiology Consult  _________________________    Patient is a 51y old  Male who presents with a chief complaint of abnormal ekg (19 Mar 2021 02:29)      HPI:  51 yr old morbidly obese male with a hx of GERD presents with 3 weeks of chest discomfort on the left side non radiating without any exacerbating factors. He states rubbing his chest makes it feel better. He went to his primary care doctor and was found to have changes in his EKG. He was advised to go to the emergency room for further workup.  Last stress test and echo was 1 year ago. Patient does state he has had some increasing shortness of breath with ambulation.  Denies n/v, diaphoresis, family history of heart disease.       PAST MEDICAL & SURGICAL HISTORY:  GERD (gastroesophageal reflux disease)    No significant past surgical history        MEDICATIONS  (STANDING):  aspirin  chewable 324 milliGRAM(s) Oral daily  atorvastatin 40 milliGRAM(s) Oral at bedtime  enoxaparin Injectable 40 milliGRAM(s) SubCutaneous daily  pantoprazole    Tablet 40 milliGRAM(s) Oral before breakfast    MEDICATIONS  (PRN):  acetaminophen   Tablet .. 650 milliGRAM(s) Oral every 6 hours PRN Mild Pain (1 - 3), Moderate Pain (4 - 6)  ondansetron Injectable 4 milliGRAM(s) IV Push every 6 hours PRN Nausea      Allergies    No Known Allergies    Intolerances        Social Histroy: Tobacco- , ETOH-, Illicit Drugs-    T(C): 36.5 (03-19-21 @ 04:17), Max: 37.3 (03-18-21 @ 15:28)  HR: 75 (03-19-21 @ 08:34) (53 - 80)  BP: 120/81 (03-19-21 @ 04:17) (120/81 - 146/76)  RR: 17 (03-19-21 @ 04:17) (16 - 19)  SpO2: 96% (03-19-21 @ 04:17) (96% - 98%)  I&O's Summary      Review of Systems:  Constitutional: [ ] Fever [ ] Chills [ ] Fatigue [ ] Weight change   HEENT: [ ] Blurred vision [ ] Eye Pain [ ] Headache [ ] Runny nose [ ] Sore Throat   Respiratory: [ ] Cough [ ] Wheezing [ ] Shortness of breath  Cardiovascular: [x ] Chest Pain [ ] Palpitations [ ] RAMOS [ ] PND [ ] Orthopnea  Gastrointestinal: [ ] Abdominal Pain [ ] Diarrhea [ ] Constipation [ ] Hemorrhoids [ ] Nausea [ ] Vomiting  Genitourinary: [ ] Nocturia [ ] Dysuria [ ] Incontinence  Extremities: [x ] Swelling [ ] Joint Pain  Neurologic: [ ] Focal deficit [ ] Paresthesias [ ] Syncope  Lymphatic: [ ] Swelling [ ] Lymphadenopathy   Skin: [ ] Rash [ ] Ecchymoses [ ] Wounds [ ] Lesions  Psychiatry: [ ] Depression [ ] Suicidal/Homicidal Ideation [ ] Anxiety [ ] Sleep Disturbances  [x ] 10 point review of systems is otherwise negative except as mentioned above            [ ]Unable to obtain    PHYSICAL EXAM:  GENERAL: Alert, NAD  NECK: Supple  CHEST/LUNG: Clear to auscultation bilaterally; No wheezes, rales, or rhonchi  HEART: S1 S2 normal, RRR,  No murmurs, rubs, or gallops  ABDOMEN: Soft, Nondistended  EXTREMITIES:  Nonpitting LE edema.      LABS:                        14.6   11.68 )-----------( 273      ( 19 Mar 2021 07:14 )             45.0     03-18    142  |  106  |  14  ----------------------------<  95  4.3   |  26  |  0.84    Ca    9.8      18 Mar 2021 17:47  Phos  3.0     03-18  Mg     2.2     03-18    TPro  7.6  /  Alb  4.3  /  TBili  0.6  /  DBili  x   /  AST  18  /  ALT  27  /  AlkPhos  59  03-18    PT/INR - ( 18 Mar 2021 17:47 )   PT: 13.3 sec;   INR: 1.11 ratio         PTT - ( 18 Mar 2021 17:47 )  PTT:32.8 sec      Serum Pro-Brain Natriuretic Peptide: 144 pg/mL (03-18-21 @ 17:47)          MEDICATIONS  (STANDING):  aspirin  chewable 324 milliGRAM(s) Oral daily  atorvastatin 40 milliGRAM(s) Oral at bedtime  enoxaparin Injectable 40 milliGRAM(s) SubCutaneous daily  pantoprazole    Tablet 40 milliGRAM(s) Oral before breakfast    MEDICATIONS  (PRN):  acetaminophen   Tablet .. 650 milliGRAM(s) Oral every 6 hours PRN Mild Pain (1 - 3), Moderate Pain (4 - 6)  ondansetron Injectable 4 milliGRAM(s) IV Push every 6 hours PRN Nausea      Troponin T, High Sensitivity Result: <6 ng/L (03-18-21 @ 21:02)  Troponin T, High Sensitivity Result: <6 ng/L (03-18-21 @ 17:47)      RADIOLOGY & ADDITIONAL TESTS:    Cardiology testing:  EKG: sinus with pvcs  Telemetry: sinus with pvcs

## 2021-03-20 LAB
COVID-19 SPIKE DOMAIN AB INTERP: POSITIVE
COVID-19 SPIKE DOMAIN ANTIBODY RESULT: 27.4 AU/ML — HIGH
SARS-COV-2 IGG+IGM SERPL QL IA: 27.4 AU/ML — HIGH
SARS-COV-2 IGG+IGM SERPL QL IA: POSITIVE

## 2021-03-22 PROCEDURE — C1887: CPT

## 2021-03-22 PROCEDURE — C1894: CPT

## 2021-03-22 PROCEDURE — 85379 FIBRIN DEGRADATION QUANT: CPT

## 2021-03-22 PROCEDURE — 85610 PROTHROMBIN TIME: CPT

## 2021-03-22 PROCEDURE — 84484 ASSAY OF TROPONIN QUANT: CPT

## 2021-03-22 PROCEDURE — 83690 ASSAY OF LIPASE: CPT

## 2021-03-22 PROCEDURE — 71045 X-RAY EXAM CHEST 1 VIEW: CPT

## 2021-03-22 PROCEDURE — C1769: CPT

## 2021-03-22 PROCEDURE — 93454 CORONARY ARTERY ANGIO S&I: CPT

## 2021-03-22 PROCEDURE — 86769 SARS-COV-2 COVID-19 ANTIBODY: CPT

## 2021-03-22 PROCEDURE — 85025 COMPLETE CBC W/AUTO DIFF WBC: CPT

## 2021-03-22 PROCEDURE — 85730 THROMBOPLASTIN TIME PARTIAL: CPT

## 2021-03-22 PROCEDURE — 84100 ASSAY OF PHOSPHORUS: CPT

## 2021-03-22 PROCEDURE — 99152 MOD SED SAME PHYS/QHP 5/>YRS: CPT

## 2021-03-22 PROCEDURE — C8929: CPT

## 2021-03-22 PROCEDURE — 80053 COMPREHEN METABOLIC PANEL: CPT

## 2021-03-22 PROCEDURE — 99285 EMERGENCY DEPT VISIT HI MDM: CPT

## 2021-03-22 PROCEDURE — 83735 ASSAY OF MAGNESIUM: CPT

## 2021-03-22 PROCEDURE — U0003: CPT

## 2021-03-22 PROCEDURE — U0005: CPT

## 2021-03-22 PROCEDURE — 83880 ASSAY OF NATRIURETIC PEPTIDE: CPT

## 2021-05-25 NOTE — PATIENT PROFILE ADULT. - PATIENT REPRESENTATIVE NAME
Monalisa Springer discharged to home accompanied by son.   Patient provided with the following educational materials upon discharge:  FYWB: warfarin, opioids, teds, aquacel.   Valuables and belongings sent with patient.   discharge summary, discharge instructions, medications and follow up appointments reviewed with patient and son.  Patient and son verbalized understanding and Pt taught back how to take cumadin, and get INR checks.    Freda Suyapa

## 2021-10-26 NOTE — ED ADULT TRIAGE NOTE - NS ED NOTE AC HIGH RISK COUNTRIES
Faxed to  [employer] [phone] & copy sent to scan.    Monse HENSLEY  
Form signed  Please fax  Thanks  PN    
Reason for Call:  Form, our goal is to have forms completed with 72 hours, however, some forms may require a visit or additional information.    Type of letter, form or note:    Application for meal delivery services.    Who is the form from?:   [employer]    Where did the form come from: form was faxed in    What clinic location was the form placed at?:   [facility]    Where the form was placed:   Dr. Martinez's box    What number is listed as a contact on the form?:   Fax: [phone]       Additional comments: none    Call taken on 10/26/2021 at 11:06 AM by Gina Montez        
No

## 2022-06-16 NOTE — ED ADULT NURSE NOTE - NS ED PATIENT SAFETY CONCERN
Routing refill request to provider for review/approval because:  Drug not on the [facility] refill protocol   Adalgisa Umanzor RN          No

## 2023-02-13 NOTE — DISCHARGE NOTE ADULT - FUNCTIONAL SCREEN CURRENT LEVEL: BATHING, MLM
Dear Bernice Garcia,    We are sorry you are not feeling well. Based on the responses you provided, it is recommended that you be seen in-person in urgent care so we can better evaluate your symptoms. Please click here to find the nearest urgent care location to you.   You will not be charged for this Visit. Thank you for trusting us with your care.    KYLE Tafoya CNP     (0) independent

## 2025-03-28 NOTE — PATIENT PROFILE ADULT. - NS PRO AD NO ADVANCE DIRECTIVE
No Render In Strict Bullet Format?: No Plan: Tretinoin 0.025% apply pea size amount to face at bedtime . Start every other night advance as tolerated\\nClindamycin 1% lotion apply to face every morning Detail Level: Zone

## 2025-07-08 NOTE — ED ADULT NURSE NOTE - NS ED NURSE LEVEL OF CONSCIOUSNESS ORIENTATION
POST-OP DIAGNOSIS:  Hydrocephalus, adult 08-Jul-2025 13:20:30  Navid Swain   Oriented - self; Oriented - place; Oriented - time